# Patient Record
Sex: FEMALE | Race: WHITE | NOT HISPANIC OR LATINO
[De-identification: names, ages, dates, MRNs, and addresses within clinical notes are randomized per-mention and may not be internally consistent; named-entity substitution may affect disease eponyms.]

---

## 2017-04-17 ENCOUNTER — FORM ENCOUNTER (OUTPATIENT)
Age: 82
End: 2017-04-17

## 2017-04-18 ENCOUNTER — OUTPATIENT (OUTPATIENT)
Dept: OUTPATIENT SERVICES | Facility: HOSPITAL | Age: 82
LOS: 1 days | End: 2017-04-18
Payer: MEDICARE

## 2017-04-18 ENCOUNTER — APPOINTMENT (OUTPATIENT)
Dept: CT IMAGING | Facility: IMAGING CENTER | Age: 82
End: 2017-04-18

## 2017-04-18 DIAGNOSIS — R93.8 ABNORMAL FINDINGS ON DIAGNOSTIC IMAGING OF OTHER SPECIFIED BODY STRUCTURES: ICD-10-CM

## 2017-04-18 PROCEDURE — 71250 CT THORAX DX C-: CPT

## 2017-04-21 ENCOUNTER — RESULT REVIEW (OUTPATIENT)
Age: 82
End: 2017-04-21

## 2017-04-25 ENCOUNTER — APPOINTMENT (OUTPATIENT)
Dept: PULMONOLOGY | Facility: CLINIC | Age: 82
End: 2017-04-25

## 2017-04-25 VITALS
WEIGHT: 140 LBS | BODY MASS INDEX: 24.5 KG/M2 | HEART RATE: 83 BPM | OXYGEN SATURATION: 99 % | HEIGHT: 63.5 IN | SYSTOLIC BLOOD PRESSURE: 120 MMHG | RESPIRATION RATE: 17 BRPM | DIASTOLIC BLOOD PRESSURE: 80 MMHG

## 2017-04-25 RX ORDER — ROSUVASTATIN CALCIUM 10 MG/1
10 TABLET, FILM COATED ORAL
Qty: 90 | Refills: 0 | Status: ACTIVE | COMMUNITY
Start: 2017-02-27

## 2017-05-01 ENCOUNTER — APPOINTMENT (OUTPATIENT)
Dept: SURGERY | Facility: CLINIC | Age: 82
End: 2017-05-01

## 2017-08-18 ENCOUNTER — FORM ENCOUNTER (OUTPATIENT)
Age: 82
End: 2017-08-18

## 2017-08-19 ENCOUNTER — APPOINTMENT (OUTPATIENT)
Dept: CT IMAGING | Facility: IMAGING CENTER | Age: 82
End: 2017-08-19
Payer: MEDICARE

## 2017-08-19 ENCOUNTER — OUTPATIENT (OUTPATIENT)
Dept: OUTPATIENT SERVICES | Facility: HOSPITAL | Age: 82
LOS: 1 days | End: 2017-08-19
Payer: MEDICARE

## 2017-08-19 DIAGNOSIS — J84.10 PULMONARY FIBROSIS, UNSPECIFIED: ICD-10-CM

## 2017-08-19 PROCEDURE — 71250 CT THORAX DX C-: CPT | Mod: 26

## 2017-08-19 PROCEDURE — 71250 CT THORAX DX C-: CPT

## 2017-08-23 ENCOUNTER — APPOINTMENT (OUTPATIENT)
Dept: PULMONOLOGY | Facility: CLINIC | Age: 82
End: 2017-08-23
Payer: MEDICARE

## 2017-08-23 PROCEDURE — 94620 PULMONARY STRESS TESTING SIMPLE: CPT

## 2017-08-23 PROCEDURE — 99214 OFFICE O/P EST MOD 30 MIN: CPT | Mod: 25

## 2017-08-23 PROCEDURE — 94010 BREATHING CAPACITY TEST: CPT | Mod: 59

## 2017-08-23 RX ORDER — CICLOPIROX 80 MG/ML
8 SOLUTION TOPICAL
Qty: 7 | Refills: 0 | Status: ACTIVE | COMMUNITY
Start: 2016-11-29

## 2018-02-21 ENCOUNTER — APPOINTMENT (OUTPATIENT)
Dept: PULMONOLOGY | Facility: CLINIC | Age: 83
End: 2018-02-21
Payer: MEDICARE

## 2018-02-21 VITALS
BODY MASS INDEX: 23.97 KG/M2 | WEIGHT: 137 LBS | DIASTOLIC BLOOD PRESSURE: 80 MMHG | SYSTOLIC BLOOD PRESSURE: 110 MMHG | RESPIRATION RATE: 17 BRPM | OXYGEN SATURATION: 99 % | HEIGHT: 63.5 IN | HEART RATE: 84 BPM

## 2018-02-21 PROCEDURE — 94010 BREATHING CAPACITY TEST: CPT

## 2018-02-21 PROCEDURE — 99214 OFFICE O/P EST MOD 30 MIN: CPT | Mod: 25

## 2018-02-21 RX ORDER — CARVEDILOL 3.12 MG/1
3.12 TABLET, FILM COATED ORAL
Qty: 60 | Refills: 0 | Status: ACTIVE | COMMUNITY
Start: 2017-10-19

## 2018-03-12 ENCOUNTER — APPOINTMENT (OUTPATIENT)
Dept: SURGERY | Facility: CLINIC | Age: 83
End: 2018-03-12
Payer: MEDICARE

## 2018-03-12 PROCEDURE — 99213K: CUSTOM

## 2018-08-19 ENCOUNTER — FORM ENCOUNTER (OUTPATIENT)
Age: 83
End: 2018-08-19

## 2018-08-20 ENCOUNTER — OUTPATIENT (OUTPATIENT)
Dept: OUTPATIENT SERVICES | Facility: HOSPITAL | Age: 83
LOS: 1 days | End: 2018-08-20
Payer: MEDICARE

## 2018-08-20 ENCOUNTER — APPOINTMENT (OUTPATIENT)
Dept: CT IMAGING | Facility: IMAGING CENTER | Age: 83
End: 2018-08-20
Payer: MEDICARE

## 2018-08-20 DIAGNOSIS — R93.8 ABNORMAL FINDINGS ON DIAGNOSTIC IMAGING OF OTHER SPECIFIED BODY STRUCTURES: ICD-10-CM

## 2018-08-20 PROCEDURE — 82565 ASSAY OF CREATININE: CPT

## 2018-08-20 PROCEDURE — 71260 CT THORAX DX C+: CPT | Mod: 26

## 2018-08-20 PROCEDURE — 71260 CT THORAX DX C+: CPT

## 2018-08-27 ENCOUNTER — NON-APPOINTMENT (OUTPATIENT)
Age: 83
End: 2018-08-27

## 2018-08-27 ENCOUNTER — APPOINTMENT (OUTPATIENT)
Dept: PULMONOLOGY | Facility: CLINIC | Age: 83
End: 2018-08-27
Payer: MEDICARE

## 2018-08-27 VITALS
RESPIRATION RATE: 16 BRPM | DIASTOLIC BLOOD PRESSURE: 74 MMHG | WEIGHT: 137 LBS | SYSTOLIC BLOOD PRESSURE: 130 MMHG | HEIGHT: 64 IN | HEART RATE: 85 BPM | OXYGEN SATURATION: 98 % | BODY MASS INDEX: 23.39 KG/M2

## 2018-08-27 PROCEDURE — 99214 OFFICE O/P EST MOD 30 MIN: CPT | Mod: 25

## 2018-08-27 PROCEDURE — 94618 PULMONARY STRESS TESTING: CPT

## 2018-08-27 PROCEDURE — 94010 BREATHING CAPACITY TEST: CPT | Mod: 59

## 2018-08-27 PROCEDURE — 94729 DIFFUSING CAPACITY: CPT

## 2018-08-27 RX ORDER — LOSARTAN POTASSIUM 50 MG/1
50 TABLET, FILM COATED ORAL
Qty: 90 | Refills: 0 | Status: ACTIVE | COMMUNITY
Start: 2018-05-29

## 2018-08-27 NOTE — PHYSICAL EXAM

## 2018-08-27 NOTE — ADDENDUM
[FreeTextEntry1] : Documented by Dayo Cespedes acting as a scribe for Dr. Matty Christensen on 8/27/18\par \par All medical record entries made by the Scribe were at my, Dr. Matty Christensen's, direction and personally dictated by me on 8/27/18. I have reviewed the chart and agree that the record accurately reflects my personal performance of the history, physical exam, assessment and plan. I have also personally directed, reviewed, and agree with the discharge instructions. \par \par \par \par \par

## 2018-08-27 NOTE — HISTORY OF PRESENT ILLNESS
[FreeTextEntry1] : Ms. Gruber is a 82 year old female presenting to the office for a follow up visit for abnormal chest CT, GERD, interstitial lung disease, post-nasal drip, pulmonary fibrosis, and snoring. Her chief complaint is paresthesia in her hands. \par - She a couple of nights she has been getting paresthesia at night and while driving- when she gets out of the car it resolves. She also note that when she wakes up in the morning she has some pain in her hands that dissipate. \par - She is very active but does not ambulate in the heat.\par - She notes some occasional back pain when she works for extended periods of time. \par - She denies any headaches, nausea, vomiting, fever, chills, sweats, chest pain, chest pressure, palpitations, SOB, coughing, wheezing, fatigue, diarrhea, constipation, dysphagia, dizziness, leg swelling, leg pain, itchy eyes, itchy ears, heartburn, reflux, or sour taste in the mouth.

## 2018-08-27 NOTE — ASSESSMENT
[FreeTextEntry1] : Ms. Bee is an 83 year old female with a history of ILDz/pulmonary fibrosis, GERD, and is currently doing very well from a pulmonary perspective. \par \par problem 1: bronchospasm/ Asthma \par - Continue Symbicort 160 2 inhalations BID \par -Inhaler technique reviewed as well as oral hygiene techniques reviewed with patient. Avoidance of cold air, extremes of temperature, rescue inhaler should be used before exercise. Order of medication reviewed with patient. Recommended use of a cool mist humidifier in the bedroom. Instructed to gargle and spit after inhaler use. \par \par problem 2: ILDz\par -no progression on recent CT of the chest\par -no medications or treatment \par -Etiology will depend on the causative agent possibilities include: idiopathic pulmonary fibrosis (UIP), NSIP (nonspecific interstitial pneumonia) , respiratory bronchiolitis in someone who is a smoker, drug induced lung disease, hypersensitivity pneumonitis, BOOP, sarcoidosis, chronic congestive heart failure,  rheumatologic disorder induced interstitial lung disease. Optimal diagnosing will include rheumatological panel which would include ESR, JOVANNI, ANCA, ARNP, CCP, rheumatoid factor, hypersensitivity panel, JOE1, scleroderma antibodies, sjogren's syndrome antibodies; biopsy will be necessary to definitively determine the etiology unless the CT scan findings are specific for UIP. Therapy will be based on diagnostics. \par \par problem 3: abnormal chest CT\par -repeat CT in 1 year; August 2019\par -for her calcifications recommended cardiac evaluation (Dr. Beltran) \par \par problem 4: allergy/post nasal drip\par -recommended Nasal Crom/Xlear \par -Environmental measures for allergies were encouraged including mattress and pillow cover, air purifier, and environmental controls.\par \par problem 5: GERD\par -diet controlled\par -Rule of 2s: avoid eating too much, eating too late, eating too spicy, eating two hours before bed\par -Things to avoid including overeating, spicy foods, tight clothing, eating within three hours of bed, this list is not all inclusive. \par -For treatment of reflux, possible options discussed including diet control, H2 blockers, PPIs, as well as coating motility agents discussed as treatment options. Timing of meals and proximity of last meal to sleep were discussed. If symptoms persist, a formal gastrointestinal evaluation is needed.\par \par problem 6: primary snoring\par -recommended nasal hygiene \par \par problem 7: health maintenance \par -s/p influenza vaccination\par -recommended strep pneumonia vaccines: Prevnar-13 vaccine, followed by Pneumo vaccine 23 one year following\par -recommended early intervention for URIs\par -recommended regular osteoporosis evaluations\par -recommended early dermatological evaluations\par -recommended after the age of 50 to the age of 70, colonoscopy every 5 years \par  \par \par F/U in 6 months with full PFTs \par She is encouraged to call with any changes, concerns, or questions

## 2018-08-27 NOTE — REASON FOR VISIT
[Follow-Up] : a follow-up visit [FreeTextEntry1] : abnormal chest CT, GERD, interstitial lung disease, post-nasal drip, pulmonary fibrosis, and snoring

## 2018-08-27 NOTE — PROCEDURE
[FreeTextEntry1] : PFT- spi reveals normal flows; FEV1 was 1.91 L which is 110% of predicted; normal lung volumes; normal diffusion at 13.5, which is 80% of predicted; normal flow volume loop \par \par Patient completed a 6-minute walk/exercise study in which the Lowest Pulse Ox was  93%; there was no evidence of dyspnea or fatigue. The patient walked 0.35 miles or 570.73 meters \par \par She had a CT chest scan performed on 8/20/2018, which was a stable exam.

## 2018-11-15 ENCOUNTER — APPOINTMENT (OUTPATIENT)
Dept: ORTHOPEDIC SURGERY | Facility: CLINIC | Age: 83
End: 2018-11-15
Payer: MEDICARE

## 2018-11-15 VITALS — HEIGHT: 64 IN | BODY MASS INDEX: 22.71 KG/M2 | WEIGHT: 133 LBS

## 2018-11-15 DIAGNOSIS — M19.049 PRIMARY OSTEOARTHRITIS, UNSPECIFIED HAND: ICD-10-CM

## 2018-11-15 DIAGNOSIS — M79.642 PAIN IN RIGHT HAND: ICD-10-CM

## 2018-11-15 DIAGNOSIS — M79.641 PAIN IN RIGHT HAND: ICD-10-CM

## 2018-11-15 PROCEDURE — 73110 X-RAY EXAM OF WRIST: CPT | Mod: 50

## 2018-11-15 PROCEDURE — 99203 OFFICE O/P NEW LOW 30 MIN: CPT

## 2018-11-23 ENCOUNTER — OUTPATIENT (OUTPATIENT)
Dept: OUTPATIENT SERVICES | Facility: HOSPITAL | Age: 83
LOS: 1 days | End: 2018-11-23
Payer: MEDICARE

## 2018-11-23 VITALS
TEMPERATURE: 99 F | RESPIRATION RATE: 16 BRPM | HEIGHT: 64 IN | WEIGHT: 128.09 LBS | DIASTOLIC BLOOD PRESSURE: 73 MMHG | OXYGEN SATURATION: 96 % | SYSTOLIC BLOOD PRESSURE: 149 MMHG

## 2018-11-23 DIAGNOSIS — G56.02 CARPAL TUNNEL SYNDROME, LEFT UPPER LIMB: ICD-10-CM

## 2018-11-23 DIAGNOSIS — Z98.890 OTHER SPECIFIED POSTPROCEDURAL STATES: Chronic | ICD-10-CM

## 2018-11-23 DIAGNOSIS — Z01.818 ENCOUNTER FOR OTHER PREPROCEDURAL EXAMINATION: ICD-10-CM

## 2018-11-23 LAB
ALBUMIN SERPL ELPH-MCNC: 3.5 G/DL — SIGNIFICANT CHANGE UP (ref 3.3–5)
ALP SERPL-CCNC: 52 U/L — SIGNIFICANT CHANGE UP (ref 30–120)
ALT FLD-CCNC: 25 U/L DA — SIGNIFICANT CHANGE UP (ref 10–60)
ANION GAP SERPL CALC-SCNC: 10 MMOL/L — SIGNIFICANT CHANGE UP (ref 5–17)
AST SERPL-CCNC: 15 U/L — SIGNIFICANT CHANGE UP (ref 10–40)
BILIRUB SERPL-MCNC: 0.8 MG/DL — SIGNIFICANT CHANGE UP (ref 0.2–1.2)
BUN SERPL-MCNC: 16 MG/DL — SIGNIFICANT CHANGE UP (ref 7–23)
CALCIUM SERPL-MCNC: 9.5 MG/DL — SIGNIFICANT CHANGE UP (ref 8.4–10.5)
CHLORIDE SERPL-SCNC: 104 MMOL/L — SIGNIFICANT CHANGE UP (ref 96–108)
CO2 SERPL-SCNC: 28 MMOL/L — SIGNIFICANT CHANGE UP (ref 22–31)
CREAT SERPL-MCNC: 0.57 MG/DL — SIGNIFICANT CHANGE UP (ref 0.5–1.3)
GLUCOSE SERPL-MCNC: 96 MG/DL — SIGNIFICANT CHANGE UP (ref 70–99)
HCT VFR BLD CALC: 34 % — LOW (ref 34.5–45)
HGB BLD-MCNC: 11.2 G/DL — LOW (ref 11.5–15.5)
MCHC RBC-ENTMCNC: 29.8 PG — SIGNIFICANT CHANGE UP (ref 27–34)
MCHC RBC-ENTMCNC: 32.9 GM/DL — SIGNIFICANT CHANGE UP (ref 32–36)
MCV RBC AUTO: 90.4 FL — SIGNIFICANT CHANGE UP (ref 80–100)
NRBC # BLD: 0 /100 WBCS — SIGNIFICANT CHANGE UP (ref 0–0)
PLATELET # BLD AUTO: 326 K/UL — SIGNIFICANT CHANGE UP (ref 150–400)
POTASSIUM SERPL-MCNC: 4.4 MMOL/L — SIGNIFICANT CHANGE UP (ref 3.5–5.3)
POTASSIUM SERPL-SCNC: 4.4 MMOL/L — SIGNIFICANT CHANGE UP (ref 3.5–5.3)
PROT SERPL-MCNC: 7.4 G/DL — SIGNIFICANT CHANGE UP (ref 6–8.3)
RBC # BLD: 3.76 M/UL — LOW (ref 3.8–5.2)
RBC # FLD: 14.1 % — SIGNIFICANT CHANGE UP (ref 10.3–14.5)
SODIUM SERPL-SCNC: 142 MMOL/L — SIGNIFICANT CHANGE UP (ref 135–145)
WBC # BLD: 9.54 K/UL — SIGNIFICANT CHANGE UP (ref 3.8–10.5)
WBC # FLD AUTO: 9.54 K/UL — SIGNIFICANT CHANGE UP (ref 3.8–10.5)

## 2018-11-23 PROCEDURE — 36415 COLL VENOUS BLD VENIPUNCTURE: CPT

## 2018-11-23 PROCEDURE — G0463: CPT

## 2018-11-23 PROCEDURE — 85027 COMPLETE CBC AUTOMATED: CPT

## 2018-11-23 PROCEDURE — 93005 ELECTROCARDIOGRAM TRACING: CPT

## 2018-11-23 PROCEDURE — 93010 ELECTROCARDIOGRAM REPORT: CPT

## 2018-11-23 PROCEDURE — 80053 COMPREHEN METABOLIC PANEL: CPT

## 2018-11-23 NOTE — H&P PST ADULT - PMH
Carpal tunnel syndrome of left wrist    Carpal tunnel syndrome of right wrist    Dyslipidemia    History of breast cancer  right, lobular carcinoma, 2007  History of melanoma  left arm, 1990  Hypertension    Interstitial lung disease    Pulmonary nodules

## 2018-11-23 NOTE — H&P PST ADULT - NSANTHOSAYNRD_GEN_A_CORE
No. VALERIA screening performed.  STOP BANG Legend: 0-2 = LOW Risk; 3-4 = INTERMEDIATE Risk; 5-8 = HIGH Risk

## 2018-11-23 NOTE — H&P PST ADULT - NEGATIVE ENMT SYMPTOMS
no abnormal taste sensation/no sinus symptoms/no nasal discharge/no tinnitus/no vertigo/no nasal congestion/no gum bleeding/no throat pain/no post-nasal discharge/no nose bleeds/no ear pain/no nasal obstruction/no recurrent cold sores/no dry mouth/no dysphagia/no hearing difficulty

## 2018-11-23 NOTE — H&P PST ADULT - FAMILY HISTORY
Aunt  Still living? No  Family history of breast cancer, Age at diagnosis: Age Unknown     Father  Still living? No  Family history of diabetes mellitus, Age at diagnosis: Age Unknown     Mother  Still living? No  Family history of Alzheimer's disease, Age at diagnosis: Age Unknown     Child  Still living? Yes, Estimated age: 51-60  Family history of prostate cancer, Age at diagnosis: Age Unknown

## 2018-11-23 NOTE — H&P PST ADULT - PROBLEM SELECTOR PLAN 1
"Left carpal tunnel release" on 11/30/18  Diagnostics ordered  Pending medical clearance  Instructions reviewed and signed  Contact info given  Best wishes offered

## 2018-11-23 NOTE — H&P PST ADULT - HISTORY OF PRESENT ILLNESS
82 yo female presents to PST for scheduled LEFT carpal tunnel release on 11/30/18 with Jethro Raygoza MD.  Complains of bilateral carpal tunnel syndrome with wrist pain and numbness and tingling of fingers, worse on left.  Difficulty grasping.

## 2018-11-27 RX ORDER — CHLORHEXIDINE GLUCONATE 213 G/1000ML
1 SOLUTION TOPICAL ONCE
Qty: 0 | Refills: 0 | Status: COMPLETED | OUTPATIENT
Start: 2018-11-30 | End: 2018-11-30

## 2018-11-29 ENCOUNTER — TRANSCRIPTION ENCOUNTER (OUTPATIENT)
Age: 83
End: 2018-11-29

## 2018-11-30 ENCOUNTER — RESULT REVIEW (OUTPATIENT)
Age: 83
End: 2018-11-30

## 2018-11-30 ENCOUNTER — OUTPATIENT (OUTPATIENT)
Dept: OUTPATIENT SERVICES | Facility: HOSPITAL | Age: 83
LOS: 1 days | End: 2018-11-30
Payer: MEDICARE

## 2018-11-30 ENCOUNTER — APPOINTMENT (OUTPATIENT)
Dept: ORTHOPEDIC SURGERY | Facility: HOSPITAL | Age: 83
End: 2018-11-30

## 2018-11-30 VITALS
TEMPERATURE: 98 F | RESPIRATION RATE: 18 BRPM | HEIGHT: 64 IN | SYSTOLIC BLOOD PRESSURE: 147 MMHG | OXYGEN SATURATION: 100 % | WEIGHT: 130.29 LBS | HEART RATE: 92 BPM | DIASTOLIC BLOOD PRESSURE: 66 MMHG

## 2018-11-30 VITALS
SYSTOLIC BLOOD PRESSURE: 137 MMHG | HEART RATE: 89 BPM | DIASTOLIC BLOOD PRESSURE: 62 MMHG | OXYGEN SATURATION: 96 % | RESPIRATION RATE: 20 BRPM

## 2018-11-30 DIAGNOSIS — G56.02 CARPAL TUNNEL SYNDROME, LEFT UPPER LIMB: ICD-10-CM

## 2018-11-30 DIAGNOSIS — Z98.890 OTHER SPECIFIED POSTPROCEDURAL STATES: Chronic | ICD-10-CM

## 2018-11-30 DIAGNOSIS — Z01.818 ENCOUNTER FOR OTHER PREPROCEDURAL EXAMINATION: ICD-10-CM

## 2018-11-30 PROCEDURE — 25115 REMOVE WRIST/FOREARM LESION: CPT | Mod: LT

## 2018-11-30 PROCEDURE — 88304 TISSUE EXAM BY PATHOLOGIST: CPT | Mod: 26

## 2018-11-30 PROCEDURE — 20605 DRAIN/INJ JOINT/BURSA W/O US: CPT | Mod: LT

## 2018-11-30 PROCEDURE — 64721 CARPAL TUNNEL SURGERY: CPT | Mod: LT

## 2018-11-30 PROCEDURE — 64721 CARPAL TUNNEL SURGERY: CPT | Mod: 59,LT

## 2018-11-30 PROCEDURE — 88304 TISSUE EXAM BY PATHOLOGIST: CPT

## 2018-11-30 RX ORDER — SODIUM CHLORIDE 9 MG/ML
1000 INJECTION, SOLUTION INTRAVENOUS
Qty: 0 | Refills: 0 | Status: DISCONTINUED | OUTPATIENT
Start: 2018-11-30 | End: 2018-11-30

## 2018-11-30 RX ORDER — OXYCODONE HYDROCHLORIDE 5 MG/1
5 TABLET ORAL ONCE
Qty: 0 | Refills: 0 | Status: DISCONTINUED | OUTPATIENT
Start: 2018-11-30 | End: 2018-11-30

## 2018-11-30 RX ORDER — SODIUM CHLORIDE 9 MG/ML
1000 INJECTION, SOLUTION INTRAVENOUS
Qty: 0 | Refills: 0 | Status: DISCONTINUED | OUTPATIENT
Start: 2018-11-30 | End: 2018-12-15

## 2018-11-30 RX ORDER — IBUPROFEN 200 MG
1 TABLET ORAL
Qty: 30 | Refills: 0 | OUTPATIENT
Start: 2018-11-30

## 2018-11-30 RX ADMIN — CHLORHEXIDINE GLUCONATE 1 APPLICATION(S): 213 SOLUTION TOPICAL at 08:16

## 2018-11-30 RX ADMIN — SODIUM CHLORIDE 99 MILLILITER(S): 9 INJECTION, SOLUTION INTRAVENOUS at 10:04

## 2018-11-30 NOTE — ASU DISCHARGE PLAN (ADULT/PEDIATRIC). - MEDICATION SUMMARY - MEDICATIONS TO TAKE
I will START or STAY ON the medications listed below when I get home from the hospital:    Tylenol 500 mg oral tablet  -- 2 tab(s) by mouth every 6 hours, As Needed  -- Indication: For nsaid     mg oral tablet  -- 1 tab(s) by mouth every 6 hours, As Needed -for moderate pain   -- Do not take this drug if you are pregnant.  It is very important that you take or use this exactly as directed.  Do not skip doses or discontinue unless directed by your doctor.  May cause drowsiness or dizziness.  Obtain medical advice before taking any non-prescription drugs as some may affect the action of this medication.  Take with food or milk.    -- Indication: For pain    aspirin 81 mg oral tablet  -- 1 tab(s) by mouth once a day  -- Indication: For dvtp    losartan 50 mg oral tablet  -- 1 tab(s) by mouth once a day  -- Indication: For htn    rosuvastatin 10 mg oral tablet  -- 1 tab(s) by mouth once a day (at bedtime)  -- Indication: For Cholesterol

## 2018-11-30 NOTE — ASU DISCHARGE PLAN (ADULT/PEDIATRIC). - SPECIAL INSTRUCTIONS
ice 20 minutes on alternating with 20 minutes off for 48 hours post op  mobilize digits 20 times per hour to reduce possible stiffness and /or swelling ice 20 minutes on alternating with 20 minutes off for 48 hours post op  mobilize digits 20 times per hour to reduce possible stiffness and /or swelling  call office for appointment

## 2018-11-30 NOTE — ASU DISCHARGE PLAN (ADULT/PEDIATRIC). - NURSING INSTRUCTIONS
ice 20 minutes on alternating with 20 minutes off for 48 hours post op  mobilize digits 20 times per hour to reduce possible stiffness and /or swelling

## 2018-11-30 NOTE — BRIEF OPERATIVE NOTE - PROCEDURE
<<-----Click on this checkbox to enter Procedure Carpal tunnel release, left  11/30/2018    Active  AROMANSK

## 2018-12-03 PROBLEM — R91.8 OTHER NONSPECIFIC ABNORMAL FINDING OF LUNG FIELD: Chronic | Status: ACTIVE | Noted: 2018-11-23

## 2018-12-03 PROBLEM — J84.9 INTERSTITIAL PULMONARY DISEASE, UNSPECIFIED: Chronic | Status: ACTIVE | Noted: 2018-11-23

## 2018-12-03 PROBLEM — G56.02 CARPAL TUNNEL SYNDROME, LEFT UPPER LIMB: Chronic | Status: ACTIVE | Noted: 2018-11-23

## 2018-12-03 PROBLEM — G56.01 CARPAL TUNNEL SYNDROME, RIGHT UPPER LIMB: Chronic | Status: ACTIVE | Noted: 2018-11-23

## 2018-12-03 PROBLEM — Z85.820 PERSONAL HISTORY OF MALIGNANT MELANOMA OF SKIN: Chronic | Status: ACTIVE | Noted: 2018-11-23

## 2018-12-03 PROBLEM — E78.5 HYPERLIPIDEMIA, UNSPECIFIED: Chronic | Status: ACTIVE | Noted: 2018-11-23

## 2018-12-03 PROBLEM — Z85.3 PERSONAL HISTORY OF MALIGNANT NEOPLASM OF BREAST: Chronic | Status: ACTIVE | Noted: 2018-11-23

## 2018-12-03 PROBLEM — I10 ESSENTIAL (PRIMARY) HYPERTENSION: Chronic | Status: ACTIVE | Noted: 2018-11-23

## 2018-12-13 ENCOUNTER — APPOINTMENT (OUTPATIENT)
Dept: ORTHOPEDIC SURGERY | Facility: CLINIC | Age: 83
End: 2018-12-13
Payer: MEDICARE

## 2018-12-13 DIAGNOSIS — Z78.9 OTHER SPECIFIED HEALTH STATUS: ICD-10-CM

## 2018-12-13 DIAGNOSIS — Z80.9 FAMILY HISTORY OF MALIGNANT NEOPLASM, UNSPECIFIED: ICD-10-CM

## 2018-12-13 DIAGNOSIS — Z82.61 FAMILY HISTORY OF ARTHRITIS: ICD-10-CM

## 2018-12-13 PROCEDURE — 99024 POSTOP FOLLOW-UP VISIT: CPT

## 2018-12-31 ENCOUNTER — OUTPATIENT (OUTPATIENT)
Dept: OUTPATIENT SERVICES | Facility: HOSPITAL | Age: 83
LOS: 1 days | End: 2018-12-31
Payer: MEDICARE

## 2018-12-31 VITALS
WEIGHT: 129.63 LBS | HEIGHT: 64 IN | DIASTOLIC BLOOD PRESSURE: 82 MMHG | TEMPERATURE: 98 F | SYSTOLIC BLOOD PRESSURE: 139 MMHG | HEART RATE: 78 BPM | RESPIRATION RATE: 16 BRPM | OXYGEN SATURATION: 99 %

## 2018-12-31 DIAGNOSIS — Z98.890 OTHER SPECIFIED POSTPROCEDURAL STATES: Chronic | ICD-10-CM

## 2018-12-31 DIAGNOSIS — G56.01 CARPAL TUNNEL SYNDROME, RIGHT UPPER LIMB: ICD-10-CM

## 2018-12-31 DIAGNOSIS — Z01.818 ENCOUNTER FOR OTHER PREPROCEDURAL EXAMINATION: ICD-10-CM

## 2018-12-31 LAB
ANION GAP SERPL CALC-SCNC: 9 MMOL/L — SIGNIFICANT CHANGE UP (ref 5–17)
BUN SERPL-MCNC: 19 MG/DL — SIGNIFICANT CHANGE UP (ref 7–23)
CALCIUM SERPL-MCNC: 9.6 MG/DL — SIGNIFICANT CHANGE UP (ref 8.4–10.5)
CHLORIDE SERPL-SCNC: 103 MMOL/L — SIGNIFICANT CHANGE UP (ref 96–108)
CO2 SERPL-SCNC: 27 MMOL/L — SIGNIFICANT CHANGE UP (ref 22–31)
CREAT SERPL-MCNC: 0.56 MG/DL — SIGNIFICANT CHANGE UP (ref 0.5–1.3)
GLUCOSE SERPL-MCNC: 91 MG/DL — SIGNIFICANT CHANGE UP (ref 70–99)
HCT VFR BLD CALC: 36.5 % — SIGNIFICANT CHANGE UP (ref 34.5–45)
HGB BLD-MCNC: 11.9 G/DL — SIGNIFICANT CHANGE UP (ref 11.5–15.5)
MCHC RBC-ENTMCNC: 29.6 PG — SIGNIFICANT CHANGE UP (ref 27–34)
MCHC RBC-ENTMCNC: 32.6 GM/DL — SIGNIFICANT CHANGE UP (ref 32–36)
MCV RBC AUTO: 90.8 FL — SIGNIFICANT CHANGE UP (ref 80–100)
NRBC # BLD: 0 /100 WBCS — SIGNIFICANT CHANGE UP (ref 0–0)
PLATELET # BLD AUTO: 306 K/UL — SIGNIFICANT CHANGE UP (ref 150–400)
POTASSIUM SERPL-MCNC: 4 MMOL/L — SIGNIFICANT CHANGE UP (ref 3.5–5.3)
POTASSIUM SERPL-SCNC: 4 MMOL/L — SIGNIFICANT CHANGE UP (ref 3.5–5.3)
RBC # BLD: 4.02 M/UL — SIGNIFICANT CHANGE UP (ref 3.8–5.2)
RBC # FLD: 14.1 % — SIGNIFICANT CHANGE UP (ref 10.3–14.5)
SODIUM SERPL-SCNC: 139 MMOL/L — SIGNIFICANT CHANGE UP (ref 135–145)
WBC # BLD: 9.39 K/UL — SIGNIFICANT CHANGE UP (ref 3.8–10.5)
WBC # FLD AUTO: 9.39 K/UL — SIGNIFICANT CHANGE UP (ref 3.8–10.5)

## 2018-12-31 PROCEDURE — 80048 BASIC METABOLIC PNL TOTAL CA: CPT

## 2018-12-31 PROCEDURE — G0463: CPT

## 2018-12-31 PROCEDURE — 85027 COMPLETE CBC AUTOMATED: CPT

## 2018-12-31 PROCEDURE — 36415 COLL VENOUS BLD VENIPUNCTURE: CPT

## 2018-12-31 RX ORDER — ACETAMINOPHEN 500 MG
2 TABLET ORAL
Qty: 0 | Refills: 0 | COMMUNITY

## 2018-12-31 RX ORDER — GABAPENTIN 400 MG/1
1 CAPSULE ORAL
Qty: 0 | Refills: 0 | COMMUNITY

## 2018-12-31 RX ORDER — LOSARTAN POTASSIUM 100 MG/1
1 TABLET, FILM COATED ORAL
Qty: 0 | Refills: 0 | COMMUNITY

## 2018-12-31 RX ORDER — ROSUVASTATIN CALCIUM 5 MG/1
1 TABLET ORAL
Qty: 0 | Refills: 0 | COMMUNITY

## 2018-12-31 RX ORDER — CHOLECALCIFEROL (VITAMIN D3) 125 MCG
2 CAPSULE ORAL
Qty: 0 | Refills: 0 | COMMUNITY

## 2018-12-31 RX ORDER — ACETAMINOPHEN 500 MG
1 TABLET ORAL
Qty: 0 | Refills: 0 | COMMUNITY

## 2018-12-31 RX ORDER — ACETYLCYSTEINE 200 MG/ML
900 VIAL (ML) MISCELLANEOUS
Qty: 0 | Refills: 0 | COMMUNITY

## 2018-12-31 RX ORDER — ASPIRIN/CALCIUM CARB/MAGNESIUM 324 MG
1 TABLET ORAL
Qty: 0 | Refills: 0 | COMMUNITY

## 2018-12-31 NOTE — H&P PST ADULT - HISTORY OF PRESENT ILLNESS
82yo right hand dominant female patient with several year history of mild tingling in both hands. Since September of this year, symptoms became worse and she developed pain, numbness and some tingling in both hands. She is s/p left Carpal Tunnel Release in November. She states that her right hand pain is approximately 7 when she gets up in the morning, and she has stiffness and numbness. As the day progresses, pain eases up. She is taking Gabapentin which she thinks is helping. She takes Tylenol prn with relief. She was told that right release is now recommended and presents today for PSTs.

## 2018-12-31 NOTE — H&P PST ADULT - PMH
Carpal tunnel syndrome of left wrist    Carpal tunnel syndrome of right wrist    Dyslipidemia    History of breast cancer  right, lobular carcinoma, 2007  History of melanoma  left arm, 1990  Hypertension    Interstitial lung disease    Mitral valve prolapse    Pulmonary fibrosis    Pulmonary nodules    Vitamin D deficiency

## 2018-12-31 NOTE — H&P PST ADULT - MUSCULOSKELETAL
details… detailed exam decreased ROM due to pain/diminished strength/no joint warmth/no joint erythema/no calf tenderness/decreased ROM/right hand/no joint swelling

## 2018-12-31 NOTE — H&P PST ADULT - ASSESSMENT
82yo female pt scheduled for surgery on 1/18/18. She will obtain Medical Clearance from her PMD. She will be NPO as per Anesthesia and will take Pepcid at hs on 1/17. On AM of surgery she will take Pepcid with a sip of water. All other pre-op instructions reviewed with patient.

## 2019-01-17 ENCOUNTER — TRANSCRIPTION ENCOUNTER (OUTPATIENT)
Age: 84
End: 2019-01-17

## 2019-01-18 ENCOUNTER — APPOINTMENT (OUTPATIENT)
Dept: ORTHOPEDIC SURGERY | Facility: HOSPITAL | Age: 84
End: 2019-01-18

## 2019-01-18 ENCOUNTER — OUTPATIENT (OUTPATIENT)
Dept: OUTPATIENT SERVICES | Facility: HOSPITAL | Age: 84
LOS: 1 days | End: 2019-01-18
Payer: MEDICARE

## 2019-01-18 VITALS
SYSTOLIC BLOOD PRESSURE: 116 MMHG | OXYGEN SATURATION: 100 % | DIASTOLIC BLOOD PRESSURE: 76 MMHG | WEIGHT: 131.84 LBS | TEMPERATURE: 99 F | HEART RATE: 76 BPM | HEIGHT: 64 IN | RESPIRATION RATE: 23 BRPM

## 2019-01-18 VITALS
OXYGEN SATURATION: 100 % | RESPIRATION RATE: 18 BRPM | HEART RATE: 77 BPM | SYSTOLIC BLOOD PRESSURE: 130 MMHG | DIASTOLIC BLOOD PRESSURE: 50 MMHG

## 2019-01-18 DIAGNOSIS — G56.01 CARPAL TUNNEL SYNDROME, RIGHT UPPER LIMB: ICD-10-CM

## 2019-01-18 DIAGNOSIS — Z98.890 OTHER SPECIFIED POSTPROCEDURAL STATES: Chronic | ICD-10-CM

## 2019-01-18 PROCEDURE — 64721 CARPAL TUNNEL SURGERY: CPT | Mod: 79,RT

## 2019-01-18 PROCEDURE — 64721 CARPAL TUNNEL SURGERY: CPT | Mod: RT

## 2019-01-18 PROCEDURE — 20605 DRAIN/INJ JOINT/BURSA W/O US: CPT | Mod: 59,79,RT

## 2019-01-18 RX ORDER — SODIUM CHLORIDE 9 MG/ML
1000 INJECTION, SOLUTION INTRAVENOUS
Qty: 0 | Refills: 0 | Status: DISCONTINUED | OUTPATIENT
Start: 2019-01-18 | End: 2019-02-02

## 2019-01-18 RX ORDER — CHLORHEXIDINE GLUCONATE 213 G/1000ML
1 SOLUTION TOPICAL ONCE
Qty: 0 | Refills: 0 | Status: COMPLETED | OUTPATIENT
Start: 2019-01-18 | End: 2019-01-18

## 2019-01-18 RX ORDER — OXYCODONE AND ACETAMINOPHEN 5; 325 MG/1; MG/1
1 TABLET ORAL ONCE
Qty: 0 | Refills: 0 | Status: DISCONTINUED | OUTPATIENT
Start: 2019-01-18 | End: 2019-01-18

## 2019-01-18 RX ORDER — HYDROMORPHONE HYDROCHLORIDE 2 MG/ML
0.5 INJECTION INTRAMUSCULAR; INTRAVENOUS; SUBCUTANEOUS
Qty: 0 | Refills: 0 | Status: DISCONTINUED | OUTPATIENT
Start: 2019-01-18 | End: 2019-01-18

## 2019-01-18 RX ORDER — ACETAMINOPHEN 500 MG
650 TABLET ORAL EVERY 6 HOURS
Qty: 0 | Refills: 0 | Status: COMPLETED | OUTPATIENT
Start: 2019-01-18 | End: 2019-01-18

## 2019-01-18 RX ORDER — OXYCODONE AND ACETAMINOPHEN 5; 325 MG/1; MG/1
2 TABLET ORAL ONCE
Qty: 0 | Refills: 0 | Status: DISCONTINUED | OUTPATIENT
Start: 2019-01-18 | End: 2019-01-18

## 2019-01-18 RX ORDER — IBUPROFEN 200 MG
1 TABLET ORAL
Qty: 30 | Refills: 0 | OUTPATIENT
Start: 2019-01-18

## 2019-01-18 RX ADMIN — SODIUM CHLORIDE 50 MILLILITER(S): 9 INJECTION, SOLUTION INTRAVENOUS at 08:48

## 2019-01-18 RX ADMIN — Medication 650 MILLIGRAM(S): at 08:48

## 2019-01-18 RX ADMIN — Medication 650 MILLIGRAM(S): at 09:05

## 2019-01-18 RX ADMIN — CHLORHEXIDINE GLUCONATE 1 APPLICATION(S): 213 SOLUTION TOPICAL at 06:43

## 2019-01-18 NOTE — ASU DISCHARGE PLAN (ADULT/PEDIATRIC). - MEDICATION SUMMARY - MEDICATIONS TO TAKE
I will START or STAY ON the medications listed below when I get home from the hospital:     mg oral tablet  -- 1 tab(s) by mouth every 6 hours, As Needed -for moderate pain   -- Do not take this drug if you are pregnant.  It is very important that you take or use this exactly as directed.  Do not skip doses or discontinue unless directed by your doctor.  May cause drowsiness or dizziness.  Obtain medical advice before taking any non-prescription drugs as some may affect the action of this medication.  Take with food or milk.    -- Indication: For pain    Tylenol 500 mg oral tablet  -- 1 tab(s) by mouth every 8 hours, As Needed  -- Indication: For nsaid    losartan 50 mg oral tablet  -- 1 tab(s) by mouth once a day  -- Indication: For htn    gabapentin 300 mg oral tablet  -- 1 tab(s) by mouth 2 times a day  -- Indication: For Chronic pain    acetylcysteine  -- 900 milligram(s) by mouth once a day  -- Indication: For home med    rosuvastatin 10 mg oral tablet  -- 1 tab(s) by mouth once a day (at bedtime)  -- Indication: For Cholesterol    Multiple Vitamins oral tablet  -- 1 tab(s) by mouth once a day  -- Indication: For vitamin    Vitamin D3 2000 intl units oral tablet  -- 2 tab(s) by mouth 2 times a day  -- Indication: For vitamin

## 2019-01-18 NOTE — ASU DISCHARGE PLAN (ADULT/PEDIATRIC). - NOTIFY
Bleeding that does not stop/Swelling that continues/Fever greater than 101/Pain not relieved by Medications/Numbness, color, or temperature change to extremity

## 2019-01-18 NOTE — BRIEF OPERATIVE NOTE - PROCEDURE
<<-----Click on this checkbox to enter Procedure Carpal tunnel release, right  01/18/2019    Active  AROMANSK

## 2019-01-21 PROBLEM — E55.9 VITAMIN D DEFICIENCY, UNSPECIFIED: Chronic | Status: ACTIVE | Noted: 2018-12-31

## 2019-01-21 PROBLEM — I34.1 NONRHEUMATIC MITRAL (VALVE) PROLAPSE: Chronic | Status: ACTIVE | Noted: 2018-12-31

## 2019-01-21 PROBLEM — J84.10 PULMONARY FIBROSIS, UNSPECIFIED: Chronic | Status: ACTIVE | Noted: 2018-12-31

## 2019-01-28 ENCOUNTER — APPOINTMENT (OUTPATIENT)
Dept: ORTHOPEDIC SURGERY | Facility: CLINIC | Age: 84
End: 2019-01-28
Payer: MEDICARE

## 2019-01-28 VITALS — WEIGHT: 133 LBS | HEIGHT: 64 IN | BODY MASS INDEX: 22.71 KG/M2

## 2019-01-28 DIAGNOSIS — G56.03 CARPAL TUNNEL SYNDROM,BILATERAL UPPER LIMBS: ICD-10-CM

## 2019-01-28 PROCEDURE — 99024 POSTOP FOLLOW-UP VISIT: CPT

## 2019-01-28 NOTE — END OF VISIT
[FreeTextEntry3] : I, Jethro Naqvi MD, ordering physician, have read and attest that all the information, medical decision making and discharge instructions within are true and accurate\par

## 2019-01-28 NOTE — ADDENDUM
[FreeTextEntry1] : I, Kaylan Blanco wrote this note acting as a scribe for Dr. Jethro Naqvi on Jan 28, 2019.

## 2019-01-28 NOTE — HISTORY OF PRESENT ILLNESS
[de-identified] : S/p Right CTR 1/18/2019\par s/p L CTR 11/30/18 [de-identified] : 84 yo F presents for 1st postop visit following right CTR 1/18/2019.  Since discharge, she feels much better pain-wise compared to preop.  She has minimal numbness at the tip of the right middle finger.  She remains with more pronounced numbness in the left middle finger.  \par \par She also returns for the 2nd postoperative visit after undergoing CTR of the left hand at [facility]. The surgery was on 11/30/2018.  [de-identified] : Incision is healed. There are no signs of infection. Right hand sutures were removed.  Limited flexion of the fingers on the left hand.  [de-identified] : No imaging done today.  [de-identified] : The patient wishes to proceed with physical therapy. A script was given. Massage the scar with vitamin E oil. Hand exercises were recommended to regain mobility and strength. The patient was advised to soak hand in warm water and Epsom salt. Follow up in 6 weeks.

## 2019-02-25 ENCOUNTER — APPOINTMENT (OUTPATIENT)
Dept: PULMONOLOGY | Facility: CLINIC | Age: 84
End: 2019-02-25

## 2019-03-05 ENCOUNTER — APPOINTMENT (OUTPATIENT)
Dept: PULMONOLOGY | Facility: CLINIC | Age: 84
End: 2019-03-05
Payer: MEDICARE

## 2019-03-05 ENCOUNTER — NON-APPOINTMENT (OUTPATIENT)
Age: 84
End: 2019-03-05

## 2019-03-05 VITALS
HEART RATE: 98 BPM | DIASTOLIC BLOOD PRESSURE: 84 MMHG | TEMPERATURE: 98.3 F | OXYGEN SATURATION: 97 % | BODY MASS INDEX: 23.92 KG/M2 | RESPIRATION RATE: 17 BRPM | WEIGHT: 135 LBS | HEIGHT: 63 IN | SYSTOLIC BLOOD PRESSURE: 144 MMHG

## 2019-03-05 PROCEDURE — 99214 OFFICE O/P EST MOD 30 MIN: CPT | Mod: 25

## 2019-03-05 PROCEDURE — 94618 PULMONARY STRESS TESTING: CPT

## 2019-03-05 PROCEDURE — 94010 BREATHING CAPACITY TEST: CPT

## 2019-03-05 RX ORDER — LOSARTAN POTASSIUM 100 MG/1
TABLET, FILM COATED ORAL
Refills: 0 | Status: ACTIVE | COMMUNITY

## 2019-03-05 RX ORDER — RAMIPRIL 10 MG/1
10 CAPSULE ORAL
Qty: 180 | Refills: 0 | Status: DISCONTINUED | COMMUNITY
Start: 2016-12-13 | End: 2019-03-05

## 2019-03-05 NOTE — PHYSICAL EXAM

## 2019-03-05 NOTE — PROCEDURE
[FreeTextEntry1] : PFT- spi reveals normal flows; FEV1 was 1.86 L which is 107% of predicted;  normal flow volume loop \par \par 6 minute walk test reveals a low saturation of 91% with no evidence of dyspnea or fatigue; walked 391.2 meters

## 2019-03-05 NOTE — HISTORY OF PRESENT ILLNESS
[FreeTextEntry1] : Ms. Gruber is a 84 year old female presenting to the office for a follow up visit for abnormal chest CT, GERD, interstitial lung disease, post-nasal drip, pulmonary fibrosis, and snoring. Her chief complaint is \par - She states that she is s/p bilateral carpal tunnel release in January 2019 with Dr. Jethro Naqvi and will be undergoing OT. \par - She states that her sleep is overall good, but she will wake occasionally during the night due to urination. \par - She does report that her sleep has improved in quality following carpal tunnel release. \par - She states that her weight has been stable. \par - She states that her bowels have been regular. \par - She does report sinus congestion in the mornings occasionally. \par - She states that she had an EMG done, which has affected her ROM in her shoulders. \par - She denies any headaches, nausea, vomiting, fever, chills, sweats, chest pain, chest pressure, diarrhea, constipation, dysphagia, dizziness, leg swelling, leg pain, itchy eyes, itchy ears, heartburn, reflux, sour taste in the mouth, cough, SOB, wheeze, joint aches or pains, muscle aches or pains.

## 2019-03-05 NOTE — ASSESSMENT
[FreeTextEntry1] : Ms. Bee is an 84 year old female with a history of ILDz/pulmonary fibrosis, GERD, and is currently doing very well from a pulmonary perspective. \par \par problem 1: bronchospasm/ Asthma - stable\par - Continue Symbicort 160 2 inhalations BID \par -Inhaler technique reviewed as well as oral hygiene techniques reviewed with patient. Avoidance of cold air, extremes of temperature, rescue inhaler should be used before exercise. Order of medication reviewed with patient. Recommended use of a cool mist humidifier in the bedroom. Instructed to gargle and spit after inhaler use. \par \par problem 2: ILDz\par -no progression on recent CT of the chest 8/18 - next 8/19\par -no medications or treatment \par -Etiology will depend on the causative agent possibilities include: idiopathic pulmonary fibrosis (UIP), NSIP (nonspecific interstitial pneumonia) , respiratory bronchiolitis in someone who is a smoker, drug induced lung disease, hypersensitivity pneumonitis, BOOP, sarcoidosis, chronic congestive heart failure,  rheumatologic disorder induced interstitial lung disease. Optimal diagnosing will include rheumatological panel which would include ESR, JOVANNI, ANCA, ARNP, CCP, rheumatoid factor, hypersensitivity panel, JOE1, scleroderma antibodies, sjogren's syndrome antibodies; biopsy will be necessary to definitively determine the etiology unless the CT scan findings are specific for UIP. Therapy will be based on diagnostics. \par \par problem 3: abnormal chest CT\par -repeat CT in 1 year; August 2019\par -for her calcifications recommended cardiac evaluation (Dr. Beltran) \par \par problem 4: allergy/post nasal drip\par -recommended Nasal Crom/Xlear \par -Environmental measures for allergies were encouraged including mattress and pillow cover, air purifier, and environmental controls.\par \par problem 5: GERD\par -diet controlled\par -Rule of 2s: avoid eating too much, eating too late, eating too spicy, eating two hours before bed\par -Things to avoid including overeating, spicy foods, tight clothing, eating within three hours of bed, this list is not all inclusive. \par -For treatment of reflux, possible options discussed including diet control, H2 blockers, PPIs, as well as coating motility agents discussed as treatment options. Timing of meals and proximity of last meal to sleep were discussed. If symptoms persist, a formal gastrointestinal evaluation is needed.\par \par problem 6: primary snoring\par -recommended nasal hygiene \par \par problem 7: health maintenance \par -s/p influenza vaccination 2018\par -s/p Shingrix 12/18\par -s/p strep pneumonia vaccines: Prevnar-13 vaccine, followed by Pneumo vaccine 23 one year following (done)\par -recommended early intervention for URIs\par -recommended regular osteoporosis evaluations\par -recommended early dermatological evaluations\par -recommended after the age of 50 to the age of 70, colonoscopy every 5 years \par  \par \par F/U in 6 months with full PFTs \par She is encouraged to call with any changes, concerns, or questions

## 2019-03-05 NOTE — ADDENDUM
[FreeTextEntry1] : Documented by BOLA FLORES acting as a scribe for Dr. Matty Christensen on 03/05/2019.\par \par All medical record entries made by the Scribe were at my, Dr. Matty Christensen's, direction and personally dictated by me on 03/05/2019. I have reviewed the chart and agree that the record accurately reflects my personal performance of the history, physical exam, assessment and plan. I have also personally directed, reviewed, and agree with the discharge instructions. \par \par \par \par

## 2019-03-18 ENCOUNTER — APPOINTMENT (OUTPATIENT)
Dept: ORTHOPEDIC SURGERY | Facility: CLINIC | Age: 84
End: 2019-03-18

## 2019-04-15 NOTE — ASU PATIENT PROFILE, ADULT - PATIENT KNOW
Quality 110: Preventive Care And Screening: Influenza Immunization: Influenza immunization was not ordered or administered, reason not given Detail Level: Detailed yes

## 2019-05-13 ENCOUNTER — APPOINTMENT (OUTPATIENT)
Dept: SURGERY | Facility: CLINIC | Age: 84
End: 2019-05-13
Payer: MEDICARE

## 2019-05-13 PROCEDURE — 99213K: CUSTOM

## 2019-10-01 ENCOUNTER — FORM ENCOUNTER (OUTPATIENT)
Age: 84
End: 2019-10-01

## 2019-10-02 ENCOUNTER — OUTPATIENT (OUTPATIENT)
Dept: OUTPATIENT SERVICES | Facility: HOSPITAL | Age: 84
LOS: 1 days | End: 2019-10-02
Payer: MEDICARE

## 2019-10-02 ENCOUNTER — APPOINTMENT (OUTPATIENT)
Dept: CT IMAGING | Facility: IMAGING CENTER | Age: 84
End: 2019-10-02
Payer: MEDICARE

## 2019-10-02 DIAGNOSIS — J84.10 PULMONARY FIBROSIS, UNSPECIFIED: ICD-10-CM

## 2019-10-02 DIAGNOSIS — Z98.890 OTHER SPECIFIED POSTPROCEDURAL STATES: Chronic | ICD-10-CM

## 2019-10-02 DIAGNOSIS — R93.89 ABNORMAL FINDINGS ON DIAGNOSTIC IMAGING OF OTHER SPECIFIED BODY STRUCTURES: ICD-10-CM

## 2019-10-02 DIAGNOSIS — Z87.891 PERSONAL HISTORY OF NICOTINE DEPENDENCE: ICD-10-CM

## 2019-10-02 PROCEDURE — 71250 CT THORAX DX C-: CPT

## 2019-10-02 PROCEDURE — 71250 CT THORAX DX C-: CPT | Mod: 26

## 2019-10-08 ENCOUNTER — APPOINTMENT (OUTPATIENT)
Dept: PULMONOLOGY | Facility: CLINIC | Age: 84
End: 2019-10-08
Payer: MEDICARE

## 2019-10-08 VITALS
WEIGHT: 137 LBS | SYSTOLIC BLOOD PRESSURE: 130 MMHG | OXYGEN SATURATION: 98 % | RESPIRATION RATE: 17 BRPM | DIASTOLIC BLOOD PRESSURE: 80 MMHG | BODY MASS INDEX: 24.27 KG/M2 | HEART RATE: 80 BPM | HEIGHT: 63 IN

## 2019-10-08 PROCEDURE — ZZZZZ: CPT

## 2019-10-08 PROCEDURE — 99214 OFFICE O/P EST MOD 30 MIN: CPT | Mod: 25

## 2019-10-08 PROCEDURE — 94729 DIFFUSING CAPACITY: CPT

## 2019-10-08 PROCEDURE — 94060 EVALUATION OF WHEEZING: CPT

## 2019-10-08 PROCEDURE — 94727 GAS DIL/WSHOT DETER LNG VOL: CPT

## 2019-10-08 NOTE — HISTORY OF PRESENT ILLNESS
[FreeTextEntry1] : Ms. Gruber is a 84 year old female presenting to the office for a follow up visit for abnormal chest CT, GERD, interstitial lung disease, post-nasal drip, pulmonary fibrosis, and snoring. \par \par -Pt has been feeling overall well. Had carpal tunnel surgery recently. It has active her arthritis, but she is tolerating it well. \par -When she sleeps during the day or before bed, she will have a hard time sleeping continuously, but overall sleeps well. Pt sleeps 5-6 hours daily. \par -Her weight is stable though she may have put on a bit of weight.\par -Pt has some scarring of her pulmonary tissues. \par -Pt is not on any new medications/supplements.\par -Every morning, pt's nose runs. \par -Pt has not yet had a flu shot\par \par -she denies any headaches, nausea, vomiting, fever, chills, sweats, chest pain, chest pressure, diarrhea, constipation, dysphagia, dizziness, leg swelling, leg pain, itchy eyes, itchy ears, heartburn, reflux, or sour taste in the mouth.

## 2019-10-08 NOTE — ADDENDUM
[FreeTextEntry1] : Documented by Mayra Perez acting as scribe for Dr. Matty Christensen on (date)\par \par All medical record entries made by the scribe were at my, Dr. Matty Christensen's, direction and personally dictated by me on (date). I have reviewed the chart and agree that the record accurately reflects my personal performance of the history, physical exam, assessment and plan. I have also personally directed, reviewed, and agree with the discharge instructions. \par \par

## 2019-10-08 NOTE — PROCEDURE
[FreeTextEntry1] : CT scan from 10/02/19 showed unchanged pulmonary nodules and bibasilar reticular opacifications compatible with pulmonary fibrosis. \par \par PFT - spi reveals normal flows; FEV1 is 2.55 which is 101% of predicted, normal flow volume loop. Normal Lung Volumes / Diffusion, DLCO is 13.1 which is 77% of predicted. \par

## 2019-10-08 NOTE — ASSESSMENT
[FreeTextEntry1] : Ms. Bee is an 84 year old female with a history of ILDz/pulmonary fibrosis, RADS/PND, GERD, and is currently doing very well from a pulmonary perspective. -stable\par \par problem 1: bronchospasm/ Asthma - stable\par - Continue Symbicort 160 2 inhalations BID \par -Inhaler technique reviewed as well as oral hygiene techniques reviewed with patient. Avoidance of cold air, extremes of temperature, rescue inhaler should be used before exercise. Order of medication reviewed with patient. Recommended use of a cool mist humidifier in the bedroom. Instructed to gargle and spit after inhaler use. \par \par problem 2: ILDz\par -no progression on recent CT of the chest 10/19 - next 10/2020\par -no medications or treatment \par -Etiology will depend on the causative agent possibilities include: idiopathic pulmonary fibrosis (UIP), NSIP (nonspecific interstitial pneumonia) , respiratory bronchiolitis in someone who is a smoker, drug induced lung disease, hypersensitivity pneumonitis, BOOP, sarcoidosis, chronic congestive heart failure,  rheumatologic disorder induced interstitial lung disease. Optimal diagnosing will include rheumatological panel which would include ESR, JOVANNI, ANCA, ARNP, CCP, rheumatoid factor, hypersensitivity panel, JOE1, scleroderma antibodies, sjogren's syndrome antibodies; biopsy will be necessary to definitively determine the etiology unless the CT scan findings are specific for UIP. Therapy will be based on diagnostics. \par \par problem 3: abnormal chest CT\par -repeat CT in 1 year; 10/2020\par -for her calcifications recommended cardiac evaluation (Dr. Beltran) \par \par problem 4: allergy/post nasal drip\par -recommended Nasal Crom/Xlear \par -Environmental measures for allergies were encouraged including mattress and pillow cover, air purifier, and environmental controls.\par \par problem 5: GERD\par -diet controlled\par -Rule of 2s: avoid eating too much, eating too late, eating too spicy, eating two hours before bed\par -Things to avoid including overeating, spicy foods, tight clothing, eating within three hours of bed, this list is not all inclusive. \par -For treatment of reflux, possible options discussed including diet control, H2 blockers, PPIs, as well as coating motility agents discussed as treatment options. Timing of meals and proximity of last meal to sleep were discussed. If symptoms persist, a formal gastrointestinal evaluation is needed.\par \par problem 6: primary snoring\par -recommended nasal hygiene \par \par problem 7: health maintenance \par -s/p influenza vaccination 2019 (next week)\par -s/p Shingrix 12/18\par -s/p strep pneumonia vaccines: Prevnar-13 vaccine, followed by Pneumo vaccine 23 one year following (done)\par -recommended early intervention for URIs\par -recommended regular osteoporosis evaluations\par -recommended early dermatological evaluations\par -recommended after the age of 50 to the age of 70, colonoscopy every 5 years \par  \par \par F/U in 6 months with full PFTs \par She is encouraged to call with any changes, concerns, or questions

## 2019-10-08 NOTE — PHYSICAL EXAM
[General Appearance - Well Developed] : well developed [Normal Appearance] : normal appearance [Well Groomed] : well groomed [General Appearance - Well Nourished] : well nourished [No Deformities] : no deformities [General Appearance - In No Acute Distress] : no acute distress [Normal Conjunctiva] : the conjunctiva exhibited no abnormalities [Eyelids - No Xanthelasma] : the eyelids demonstrated no xanthelasmas [Normal Oropharynx] : normal oropharynx [II] : II [Neck Appearance] : the appearance of the neck was normal [Neck Cervical Mass (___cm)] : no neck mass was observed [Thyroid Diffuse Enlargement] : the thyroid was not enlarged [Jugular Venous Distention Increased] : there was no jugular-venous distention [Thyroid Nodule] : there were no palpable thyroid nodules [Heart Rate And Rhythm] : heart rate and rhythm were normal [Heart Sounds] : normal S1 and S2 [Murmurs] : no murmurs present [Respiration, Rhythm And Depth] : normal respiratory rhythm and effort [Exaggerated Use Of Accessory Muscles For Inspiration] : no accessory muscle use [Abdomen Soft] : soft [Abdomen Tenderness] : non-tender [Abdomen Mass (___ Cm)] : no abdominal mass palpated [Abnormal Walk] : normal gait [Gait - Sufficient For Exercise Testing] : the gait was sufficient for exercise testing [Nail Clubbing] : no clubbing of the fingernails [Cyanosis, Localized] : no localized cyanosis [Petechial Hemorrhages (___cm)] : no petechial hemorrhages [Skin Color & Pigmentation] : normal skin color and pigmentation [] : no rash [No Venous Stasis] : no venous stasis [Skin Lesions] : no skin lesions [No Xanthoma] : no  xanthoma was observed [No Skin Ulcers] : no skin ulcer [Deep Tendon Reflexes (DTR)] : deep tendon reflexes were 2+ and symmetric [Sensation] : the sensory exam was normal to light touch and pinprick [No Focal Deficits] : no focal deficits [Impaired Insight] : insight and judgment were intact [Oriented To Time, Place, And Person] : oriented to person, place, and time [Affect] : the affect was normal [FreeTextEntry1] : I:E ratio 1:3; clear

## 2020-03-14 NOTE — H&P PST ADULT - WEIGHT IN LBS
normal and S2 normal.      Assessment:  (Acute hypoxic respiratory failure  COPD exacerbation  Influenza A  CAD  HTN  Hyperlipidemia     Plan:  IV steroids, nebs, oxygen  tamiflu  Monitor labs and exam.  IV fluids. Continue home meds. ).        Shane Infante MD  3/14/2020 128

## 2020-09-01 ENCOUNTER — APPOINTMENT (OUTPATIENT)
Dept: PULMONOLOGY | Facility: CLINIC | Age: 85
End: 2020-09-01
Payer: MEDICARE

## 2020-09-01 VITALS
WEIGHT: 140 LBS | TEMPERATURE: 97.8 F | BODY MASS INDEX: 24.8 KG/M2 | OXYGEN SATURATION: 98 % | DIASTOLIC BLOOD PRESSURE: 80 MMHG | SYSTOLIC BLOOD PRESSURE: 140 MMHG | RESPIRATION RATE: 16 BRPM | HEIGHT: 63 IN | HEART RATE: 102 BPM

## 2020-09-01 PROCEDURE — 99214 OFFICE O/P EST MOD 30 MIN: CPT | Mod: 25

## 2020-09-01 PROCEDURE — 94618 PULMONARY STRESS TESTING: CPT

## 2020-09-01 NOTE — PROCEDURE
[FreeTextEntry1] : CT (OCT.2.2019) IMPRESSION: unchanged pulmonary nodules as above, bibasilar reticular opacifications compatible with pulmonary fibrosis. \par \par \par 6 minute walk test reveals a low saturation of 98% with no evidence of dyspnea or fatigue; walked  407.5 meters\par \par FENO: PT was unable to complete the test.

## 2020-09-01 NOTE — PHYSICAL EXAM

## 2020-09-01 NOTE — ADDENDUM
[FreeTextEntry1] : Documented by Kelsie Siddiqi acting as a scribe for Dr. Matty Christensen on 09/01/2020.\par \par All medical record entries made by the Scribe were at my, Dr. Matty Christensen's, direction and personally dictated by me on 09/01/2020. I have reviewed the chart and agree that the record accurately reflects my personal performance of the history, physical exam, assessment and plan. I have also personally directed, reviewed, and agree with the discharge instructions.

## 2020-09-01 NOTE — HISTORY OF PRESENT ILLNESS
[FreeTextEntry1] : Ms. Gruber is a 85 year old female presenting to the office for a follow up visit for abnormal chest CT, GERD, interstitial lung disease, post-nasal drip, pulmonary fibrosis, and snoring. \par - she is currently in shelter, its paused, shes normally a  \par - her energy levels are good, about 8 out of 10 \par - she notes a couple of months ago, it was a hot week, and she had some swelling in her foot, it lasted for a few days then went away. No swelling episodes since then.\par - about two months ago she had some heart burn, none since then \par - she notes since she was here last she has gained about 7 lbs \par - she notes she has been walking for exercise and walking up and down the stairs in her house\par - denies any palpitations\par - her sleep is sometimes good and sometimes she falls asleep watching TV so then she has trouble getting to sleep later. \par - she notes she snores \par - she is not waking up more tired than she should be \par - no visual issues, though she notes she might need cataract surgery \par - she has been taking a multi-vitamin, NAC, Vitamin D3, and Vitamin C as well as her blood pressure pill and statin. \par -she denies any headaches, nausea, vomiting, fever, chills, sweats, chest pain, chest pressure, diarrhea, constipation, dysphagia, dizziness, sour taste in the mouth, leg swelling, leg pain, itchy eyes, itchy ears, heartburn, reflux, myalgias or arthralgias.

## 2020-10-05 ENCOUNTER — APPOINTMENT (OUTPATIENT)
Dept: SURGERY | Facility: CLINIC | Age: 85
End: 2020-10-05
Payer: MEDICARE

## 2020-10-05 PROCEDURE — 99213K: CUSTOM

## 2020-10-06 ENCOUNTER — OUTPATIENT (OUTPATIENT)
Dept: OUTPATIENT SERVICES | Facility: HOSPITAL | Age: 85
LOS: 1 days | End: 2020-10-06
Payer: MEDICARE

## 2020-10-06 ENCOUNTER — APPOINTMENT (OUTPATIENT)
Dept: CT IMAGING | Facility: IMAGING CENTER | Age: 85
End: 2020-10-06
Payer: MEDICARE

## 2020-10-06 ENCOUNTER — RESULT REVIEW (OUTPATIENT)
Age: 85
End: 2020-10-06

## 2020-10-06 DIAGNOSIS — R93.89 ABNORMAL FINDINGS ON DIAGNOSTIC IMAGING OF OTHER SPECIFIED BODY STRUCTURES: ICD-10-CM

## 2020-10-06 DIAGNOSIS — J45.20 MILD INTERMITTENT ASTHMA, UNCOMPLICATED: ICD-10-CM

## 2020-10-06 DIAGNOSIS — Z98.890 OTHER SPECIFIED POSTPROCEDURAL STATES: Chronic | ICD-10-CM

## 2020-10-06 PROCEDURE — 71250 CT THORAX DX C-: CPT

## 2020-10-06 PROCEDURE — 71250 CT THORAX DX C-: CPT | Mod: 26

## 2021-01-04 DIAGNOSIS — Z01.812 ENCOUNTER FOR PREPROCEDURAL LABORATORY EXAMINATION: ICD-10-CM

## 2021-07-09 NOTE — H&P PST ADULT - MS EXT PE MLT D E PC
no pedal edema Debridement Text: The wound edges were debrided prior to proceeding with the closure to facilitate wound healing.

## 2021-08-30 ENCOUNTER — LABORATORY RESULT (OUTPATIENT)
Age: 86
End: 2021-08-30

## 2021-09-01 ENCOUNTER — APPOINTMENT (OUTPATIENT)
Dept: PULMONOLOGY | Facility: CLINIC | Age: 86
End: 2021-09-01
Payer: MEDICARE

## 2021-09-01 VITALS
TEMPERATURE: 97.5 F | SYSTOLIC BLOOD PRESSURE: 140 MMHG | OXYGEN SATURATION: 98 % | DIASTOLIC BLOOD PRESSURE: 76 MMHG | BODY MASS INDEX: 25.16 KG/M2 | HEIGHT: 63 IN | WEIGHT: 142 LBS | RESPIRATION RATE: 16 BRPM | HEART RATE: 80 BPM

## 2021-09-01 DIAGNOSIS — J45.20 MILD INTERMITTENT ASTHMA, UNCOMPLICATED: ICD-10-CM

## 2021-09-01 DIAGNOSIS — R09.82 POSTNASAL DRIP: ICD-10-CM

## 2021-09-01 DIAGNOSIS — R06.83 SNORING: ICD-10-CM

## 2021-09-01 DIAGNOSIS — K21.9 GASTRO-ESOPHAGEAL REFLUX DISEASE W/OUT ESOPHAGITIS: ICD-10-CM

## 2021-09-01 DIAGNOSIS — R06.02 SHORTNESS OF BREATH: ICD-10-CM

## 2021-09-01 DIAGNOSIS — J84.9 INTERSTITIAL PULMONARY DISEASE, UNSPECIFIED: ICD-10-CM

## 2021-09-01 DIAGNOSIS — R93.89 ABNORMAL FINDINGS ON DIAGNOSTIC IMAGING OF OTHER SPECIFIED BODY STRUCTURES: ICD-10-CM

## 2021-09-01 DIAGNOSIS — J84.10 PULMONARY FIBROSIS, UNSPECIFIED: ICD-10-CM

## 2021-09-01 PROCEDURE — 95012 NITRIC OXIDE EXP GAS DETER: CPT

## 2021-09-01 PROCEDURE — ZZZZZ: CPT

## 2021-09-01 PROCEDURE — 94618 PULMONARY STRESS TESTING: CPT

## 2021-09-01 PROCEDURE — 94729 DIFFUSING CAPACITY: CPT

## 2021-09-01 PROCEDURE — 94010 BREATHING CAPACITY TEST: CPT

## 2021-09-01 PROCEDURE — 99214 OFFICE O/P EST MOD 30 MIN: CPT | Mod: 25

## 2021-09-01 NOTE — PHYSICAL EXAM
[No Acute Distress] : no acute distress [Normal Oropharynx] : normal oropharynx [Normal Appearance] : normal appearance [No Neck Mass] : no neck mass [Normal Rate/Rhythm] : normal rate/rhythm [Normal S1, S2] : normal s1, s2 [No Murmurs] : no murmurs [No Resp Distress] : no resp distress [Clear to Auscultation Bilaterally] : clear to auscultation bilaterally [No Abnormalities] : no abnormalities [Benign] : benign [Normal Gait] : normal gait [No Clubbing] : no clubbing [No Cyanosis] : no cyanosis [No Edema] : no edema [FROM] : FROM [Normal Color/ Pigmentation] : normal color/ pigmentation [No Focal Deficits] : no focal deficits [Oriented x3] : oriented x3 [Normal Affect] : normal affect [III] : Mallampati Class: III [Kyphosis] : kyphosis [TextBox_68] : I:E 1:3, clear  [TextBox_80] : kyphotic

## 2021-09-01 NOTE — ASSESSMENT
[FreeTextEntry1] : Ms. Bee is an 86 year old female with a history of HTN, cholesterol,  ILDz/pulmonary fibrosis, RADS/PND, GERD, and is currently doing very well from a pulmonary perspective. -stable; #1 issue cervical stenosis \par \par problem 1: bronchospasm/ Asthma - stable\par - Continue Symbicort 160 2 inhalations BID \par -Inhaler technique reviewed as well as oral hygiene techniques reviewed with patient. Avoidance of cold air, extremes of temperature, rescue inhaler should be used before exercise. Order of medication reviewed with patient. Recommended use of a cool mist humidifier in the bedroom. Instructed to gargle and spit after inhaler use. \par \par problem 2: ILDz\par -no progression on recent CT of the chest 10/20 - next 10/2021\par -no medications or treatment \par -Etiology will depend on the causative agent possibilities include: idiopathic pulmonary fibrosis (UIP), NSIP (nonspecific interstitial pneumonia) , respiratory bronchiolitis in someone who is a smoker, drug induced lung disease, hypersensitivity pneumonitis, BOOP, sarcoidosis, chronic congestive heart failure,  rheumatologic disorder induced interstitial lung disease. Optimal diagnosing will include rheumatological panel which would include ESR, JOVANNI, ANCA, ARNP, CCP, rheumatoid factor, hypersensitivity panel, JOE1, scleroderma antibodies, sjogren's syndrome antibodies; biopsy will be necessary to definitively determine the etiology unless the CT scan findings are specific for UIP. Therapy will be based on diagnostics. \par \par problem 3: abnormal chest CT\par -repeat CT in 1 year; 10/2021\par -for her calcifications recommended cardiac evaluation (Dr. Beltran) \par \par problem 4: allergy/post nasal drip\par -recommended Nasal Crom/Xlear \par -Environmental measures for allergies were encouraged including mattress and pillow cover, air purifier, and environmental controls.\par \par problem 5: GERD\par -diet controlled\par -Rule of 2s: avoid eating too much, eating too late, eating too spicy, eating two hours before bed\par -Things to avoid including overeating, spicy foods, tight clothing, eating within three hours of bed, this list is not all inclusive. \par -For treatment of reflux, possible options discussed including diet control, H2 blockers, PPIs, as well as coating motility agents discussed as treatment options. Timing of meals and proximity of last meal to sleep were discussed. If symptoms persist, a formal gastrointestinal evaluation is needed.\par \par problem 6: primary snoring\par -recommended nasal hygiene \par \par Problem 7: Health Maintenance/COVID19 Precautions:\par -s/p Covid 19 Vaccine Pfizer x2 \par - Clean your hands often. Wash your hands often with soap and water for at least 20 seconds, especially after blowing your nose, coughing, or sneezing, or having been in a public place.\par - If soap and water are not available, use a hand  that contains at least 60% alcohol.\par - To the extent possible, avoid touching high-touch surfaces in public places - elevator buttons, door handles, handrails, handshaking with people, etc. Use a tissue or your sleeve to cover your hand or finger if you must touch something.\par - Wash your hands after touching surfaces in public places.\par - Avoid touching your face, nose, eyes, etc.\par - Clean and disinfect your home to remove germs: practice routine cleaning of frequently touched surfaces (for example: tables, doorknobs, light switches, handles, desks, toilets, faucets, sinks & cell phones)\par - Avoid crowds, especially in poorly ventilated spaces. Your risk of exposure to respiratory viruses like COVID-19 may increase in crowded, closed-in settings with little air circulation if there are people in the crowd who are sick. All patients are recommended to practice social distancing and stay at least 6 feet away from others.\par - Avoid all non-essential travel including plane trips, and especially avoid embarking on cruise ships.\par -If COVID-19 is spreading in your community, take extra measures to put distance between yourself and other people to further reduce your risk of being exposed to this new virus.\par -Stay home as much as possible.\par - Consider ways of getting food brought to your house through family, social, or commercial networks\par -Be aware that the virus has been known to live in the air up to 3 hours post exposure, cardboard up to 24 hours post exposure, copper up to 4 hours post exposure, steel and plastic up to 2-3 days post exposure. Risk of transmission from these surfaces are affected by many variables.\par Immune Support Recommendations:\par -OTC Vitamin C 500mg BID \par -OTC Quercetin 250-500mg BID \par -OTC Zinc 75-100mg per day \par -OTC Melatonin 1 or 2 mg a night \par -OTC Vitamin D 1-4000mg per day \par -OTC Tonic Water 8oz per day\par Asthma and COVID19:\par You need to make sure your asthma is under control. This often requires the use of inhaled corticosteroids (and sometimes oral corticosteroids). Inhaled corticosteroids do not likely reduce your immune system’s ability to fight infections, but oral corticosteroids may. It is important to use the steps above to protect yourself to limit your exposure to any respiratory virus.\par \par problem 8: health maintenance \par .recommended to improve posture and sleep posture\par .recommended strecthing \par -s/p influenza vaccination 2020\par -s/p Shingrix 12/18\par -s/p strep pneumonia vaccines: Prevnar-13 vaccine, followed by Pneumo vaccine 23 one year following (done)\par -recommended early intervention for URIs\par -recommended regular osteoporosis evaluations\par -recommended early dermatological evaluations\par -recommended after the age of 50 to the age of 70, colonoscopy every 5 years \par  \par \par F/U in 6 months with full PFTs \par She is encouraged to call with any changes, concerns, or questions

## 2021-09-01 NOTE — PROCEDURE
[FreeTextEntry1] : FULL PFTs reveals mild obstructive dysfunction; FEV1 was 1.44 L which is 78 % of predicted; normal lung volumes; mildly reduced diffusion of 7.7, which is 61 % of predicted; normal flow volume loop\par \par  FENO 12 was ; normal value being less than 25\par Fractional exhaled nitric oxide (FENO) is regarded as a simple, noninvasive method for assessing eosinophilic airway inflammation. Produced by a variety of cells within the lung, nitric oxide (NO) concentrations are generally low in healthy individuals. However, high concentrations of NO appear to be involved in nonspecific host defense mechanisms and chronic inflammatory diseases such as asthma. The American Thoracic Society (ATS) therefore has recommended using FENO to aid in the diagnosis and monitoring of eosinophilic airway inflammation and asthma, and for identifying steroid responsive individuals whose chronic respiratory symptoms may be airway inflammation. \par \par CT (10.6.2020) revealed  No change in the left lower lobe nodule or left upper lobe groundglass opacity since 8/19/2017.\par \par 6 minute walk test reveals a low saturation of 95% with slight dyspnea or fatigue; walked 293.5 meters\par

## 2021-09-01 NOTE — HISTORY OF PRESENT ILLNESS
[FreeTextEntry1] : Ms. Gruber is a 86 year old female presenting to the office for a follow up visit for abnormal chest CT, GERD, interstitial lung disease, post-nasal drip, pulmonary fibrosis, and snoring. \par \par -she notes at the end of January when shoveling snow\par -she notes having spinal stenosis, degenerative disk disease and arthritis \par -she notes undergoing physical therapy which showed some improvement \par -she notes generally feeling good\par -She notes bowels are regular \par -she denies getting enough sleep due to neck pain \par -she denies hoarseness \par -she denies SOB \par -she denies SOB on stairs\par -she notes weight is stable\par -s/p Covid 19 Vaccine Pfizer x2 \par -she notes intermittent tick that she has had all her life \par \par - She  denies any visual issues, headaches, nausea, vomiting, fever, chills, sweats, chest pains, chest pressure, diarrhea, constipation, dysphagia, dizziness, leg swelling, leg pain, itchy eyes, itchy ears, heartburn, reflux, or sour taste in the mouth.

## 2021-09-01 NOTE — ADDENDUM
[FreeTextEntry1] : Documented by John Stern acting as a scribe for Dr. Matty Christensen on 09/01/2021 \par \par All medical record entries made by the Scribe were at my, Dr. Matty Christensen's, direction and personally dictated by me on 09/01/2021 . I have reviewed the chart and agree that the record accurately reflects my personal performance of the history, physical exam, assessment and plan. I have also personally directed, reviewed, and agree with the discharge instructions

## 2021-09-15 NOTE — H&P PST ADULT - CONSTITUTIONAL
9/15/2021    Zain Lua MD  [facility] [address]  [facility] [address]    RE: Lorna Guzman       Dear Colleague,    I had the pleasure of seeing Lorna Guzman in the [facility].    SERVICE DATE: 9/15/2021     Primary Cardiologist: Dr Geiger    REASON FOR VISIT:  Lorna Guzman presents for a 3 month cardio-onc follow up    HISTORY OF PRESENT ILLNESS:   Lorna is followed in our clinic from a Cardio-Oncology perspective due to a history of a chemo-induced cardiomyopathy and metastatic breast carcinoma.    She was initially diagnosed with breast cancer in 2006, at which time she underwent a lumpectomy, CHOP chemotherapy and adjuvant radiation.  She was started on tamoxifen and after 2 years was transitioned to Aromasin.  She completed this treatment in 07/2013. Unfortunately, in late 2017 she was again diagnosed with an invasive ductal carcinoma of the right breast.  She was also found to have evidence of metastatic disease (in the liver and a lymph node) at that time.  As part of her workup, she underwent an echocardiogram which showed mildly reduced LV systolic function with an EF of approximately 47%.  It was at that time that she was referred to Cardiology.  She had a stress cardiac MRI which showed normal LV size with mild to moderately reduced function and an LVEF of 43%.  She was also noted to have mitral valve prolapse and mild mitral regurgitation.  Stress imaging did not show any evidence of ischemia.  She was started on carvedilol and lisinopril.  Her cancer was treated with 4 cycles of TC chemotherapy, and she was started on Herceptin on an every 3 week basis, which was to continue lifelong.  Fortunately, from a cardiac standpoint, followup cardiac MRIs and echocardiography have shown slight improvement/stability of her LVEF with ongoing therapy.  In the late summer/early fall of 2018 she did  develop severe facial swelling and it was felt that this was related to angioedema secondary to her lisinopril, which was discontinued.  At that time, we decided to continue with carvedilol alone unless we saw a decline in her LV systolic function. Spring 2019 she was noted to have a growing R breast mass, she was started on fulverstrant and ribociclib. She had a R mastectomy 10/2019 and had radiation in March 2020. Late 2020 she was found to have more metastatic disease in her chest/lungs. Fulverstrant and ribociclib stopped and she has started chemo with paclitaxel, pertuzumab and trastuzumab.    Unfortunately she developed a PE/DVT in Feb 2021, she was briefly hospitalized. A limited echocardiogram suggested probably stable LV systolic function, but she had significant RV dysfunction related to the pulmonary embolism.  The RV cavity was moderately to severely dilated with moderately reduced function.  Her chemo was put on hold and she also had a reduction in her carvedilol dose because of low blood pressures. Echo in March showed LVEF 45-50%, stable from Feb. GLS-17%. RV was normal size, RV systolic function is borderline reduced. She was given the ok to resume her cancer meds.    April 2021 TTE showed a decline in the GLS -15.4%, LVEF is 41%. Coreg increased to 12.5mg BID. Repeat echo 1 month later showed LVEF 45-50%, GLS -17%.    Unfortunately her scans in July showed some progression of her disease. She was transitioned to ado-trastuzumab.    Since her last visit she has been feeling well from a cardiac standpoint. No CV concerns or symptoms.    CURRENT MEDICATIONS:   Current Outpatient Medications   Medication Sig Dispense Refill     ADO-trastuzumab        carvedilol (COREG) 12.5 MG tablet Take 1 tablet (12.5 mg) by mouth 2 times daily (with meals) Increased dose. Please fill. 180 tablet 3     rivaroxaban ANTICOAGULANT (XARELTO ANTICOAGULANT) 20 MG TABS tablet Take 1 tablet (20 mg) by mouth daily (with  "dinner)         ALLERGIES:  Allergies   Allergen Reactions     Lisinopril Hives and Swelling     Venlafaxine Hives and Swelling     Possibly allergy       ROS:  Skin:  Negative     Eyes:  Positive for glasses  ENT:  Negative    Respiratory:  Negative    Cardiovascular:  Negative    Gastroenterology: Negative    Genitourinary:  Negative    Musculoskeletal:  Negative    Neurologic:  Negative    Psychiatric:  Negative    Heme/Lymph/Imm:  Negative    Endocrine:  Negative      PHYSICAL EXAMINATION:    GENERAL:  The patient is a pleasant 65 year old who appears their stated age.  In no apparent distress.  VITAL SIGNS:  /76 (BP Location: Right arm, Patient Position: Sitting, Cuff Size: Adult Regular)   Pulse 80   Ht 1.753 m (5' 9\")   Wt 57 kg (125 lb 9.6 oz)   LMP  (LMP Unknown)   SpO2 95%   Breastfeeding No   BMI 18.55 kg/m      RESPIRATORY:  Breathing is nonlabored.    CARDIAC:  RRR. No murmur  NEUROLOGIC:  Alert and oriented.    DATA REVIEWED:    TTE 9/14/21:  The visual ejection fraction is 50-55%.  GLS -18.1%.  Both the above appear slightly iimproved compared with most recent study.  Mild to mod MR, mild TR and OR.    ASSESSMENT AND PLAN:    65 year old F with metastatic breast CA with complication of recent DVT/PE in Feb 2021 and a chemo-induced CM whom returns for follow up. She is followed closely as she was on life-long herceptin, however recently was found to have disease progression and transitioned to ado-trastuzumab. She still will need serial echos with this treatment.      Her echo this week shows improvement from 3 months ago. I recommended she continue her current dose of coreg. Will plan a repeat echo in 3 months and clinic follow up at that time as well.    She is on life-long AC due to DVT/PE in the setting of CA.     Follow up: 3 months with echo prior.  Of course, the patient was encouraged to contact us sooner with questions or concerns.    Melisa Billingsley PA-C        Thank you " for allowing me to participate in the care of your patient.      Sincerely,     Melisa Billingsley PA-C     [facility]  cc:   Melisa Billingsley PA-C  [facility]  [address] [place] [address]         Well-developed, well nourished

## 2021-09-24 ENCOUNTER — NON-APPOINTMENT (OUTPATIENT)
Age: 86
End: 2021-09-24

## 2021-09-24 ENCOUNTER — APPOINTMENT (OUTPATIENT)
Dept: OPHTHALMOLOGY | Facility: CLINIC | Age: 86
End: 2021-09-24
Payer: MEDICARE

## 2021-09-24 PROCEDURE — 92014 COMPRE OPH EXAM EST PT 1/>: CPT

## 2021-09-24 PROCEDURE — 92134 CPTRZ OPH DX IMG PST SGM RTA: CPT

## 2021-09-24 PROCEDURE — 92202 OPSCPY EXTND ON/MAC DRAW: CPT

## 2021-10-04 ENCOUNTER — APPOINTMENT (OUTPATIENT)
Dept: SURGERY | Facility: CLINIC | Age: 86
End: 2021-10-04
Payer: MEDICARE

## 2021-10-04 PROCEDURE — 99213K: CUSTOM

## 2021-10-25 ENCOUNTER — APPOINTMENT (OUTPATIENT)
Dept: CT IMAGING | Facility: IMAGING CENTER | Age: 86
End: 2021-10-25
Payer: MEDICARE

## 2021-10-25 ENCOUNTER — OUTPATIENT (OUTPATIENT)
Dept: OUTPATIENT SERVICES | Facility: HOSPITAL | Age: 86
LOS: 1 days | End: 2021-10-25
Payer: MEDICARE

## 2021-10-25 DIAGNOSIS — R93.89 ABNORMAL FINDINGS ON DIAGNOSTIC IMAGING OF OTHER SPECIFIED BODY STRUCTURES: ICD-10-CM

## 2021-10-25 DIAGNOSIS — Z98.890 OTHER SPECIFIED POSTPROCEDURAL STATES: Chronic | ICD-10-CM

## 2021-10-25 DIAGNOSIS — R91.8 OTHER NONSPECIFIC ABNORMAL FINDING OF LUNG FIELD: ICD-10-CM

## 2021-10-25 PROCEDURE — 71250 CT THORAX DX C-: CPT

## 2021-10-25 PROCEDURE — 71250 CT THORAX DX C-: CPT | Mod: 26,MH

## 2021-10-27 ENCOUNTER — NON-APPOINTMENT (OUTPATIENT)
Age: 86
End: 2021-10-27

## 2021-11-04 ENCOUNTER — NON-APPOINTMENT (OUTPATIENT)
Age: 86
End: 2021-11-04

## 2021-11-10 ENCOUNTER — OUTPATIENT (OUTPATIENT)
Dept: OUTPATIENT SERVICES | Facility: HOSPITAL | Age: 86
LOS: 1 days | End: 2021-11-10
Payer: MEDICARE

## 2021-11-10 ENCOUNTER — APPOINTMENT (OUTPATIENT)
Dept: NUCLEAR MEDICINE | Facility: IMAGING CENTER | Age: 86
End: 2021-11-10
Payer: MEDICARE

## 2021-11-10 DIAGNOSIS — R91.8 OTHER NONSPECIFIC ABNORMAL FINDING OF LUNG FIELD: ICD-10-CM

## 2021-11-10 DIAGNOSIS — Z98.890 OTHER SPECIFIED POSTPROCEDURAL STATES: Chronic | ICD-10-CM

## 2021-11-10 DIAGNOSIS — R93.89 ABNORMAL FINDINGS ON DIAGNOSTIC IMAGING OF OTHER SPECIFIED BODY STRUCTURES: ICD-10-CM

## 2021-11-10 PROCEDURE — 78815 PET IMAGE W/CT SKULL-THIGH: CPT

## 2021-11-10 PROCEDURE — A9552: CPT

## 2021-11-10 PROCEDURE — 78815 PET IMAGE W/CT SKULL-THIGH: CPT | Mod: 26,PI,MH

## 2021-11-15 ENCOUNTER — NON-APPOINTMENT (OUTPATIENT)
Age: 86
End: 2021-11-15

## 2021-11-16 ENCOUNTER — APPOINTMENT (OUTPATIENT)
Dept: THORACIC SURGERY | Facility: CLINIC | Age: 86
End: 2021-11-16
Payer: MEDICARE

## 2021-11-16 ENCOUNTER — NON-APPOINTMENT (OUTPATIENT)
Age: 86
End: 2021-11-16

## 2021-11-16 VITALS
HEART RATE: 102 BPM | DIASTOLIC BLOOD PRESSURE: 91 MMHG | HEIGHT: 63 IN | WEIGHT: 142 LBS | RESPIRATION RATE: 18 BRPM | OXYGEN SATURATION: 97 % | BODY MASS INDEX: 25.16 KG/M2 | SYSTOLIC BLOOD PRESSURE: 171 MMHG

## 2021-11-16 DIAGNOSIS — R91.8 OTHER NONSPECIFIC ABNORMAL FINDING OF LUNG FIELD: ICD-10-CM

## 2021-11-16 PROCEDURE — 99205 OFFICE O/P NEW HI 60 MIN: CPT

## 2021-11-19 PROBLEM — R91.8 LUNG NODULES: Status: ACTIVE | Noted: 2020-10-09

## 2021-11-21 NOTE — HISTORY OF PRESENT ILLNESS
[FreeTextEntry1] : Ms. IRENE RONQUILLO, 86 year old female, former smoker (x 30 years; Quit 2006), w/ hx of HTN, HLD, Lobular carcinoma (s/p Lumpectomy in 2006), Interstitial lung disease, pulmonary fibrosis. Referred today by Dr. Christensen for evaluation regarding suspicious lung nodule. \par \par CT Chest on 10/25/2021:\par - New, indeterminate 1.6 x 1.1 cm right apical nodule with mild surrounding groundglass. Focal calcification within this new nodule corresponds to a known calcified granuloma present on prior studies. \par - Small clustered nodules just superior to the lesion\par - Stable, 1 cm left apical groundlgass nodule and other scattered sub- 6 mm solid pulmonary nodules since 2017. \par - Stable, mild peripheral reticulation at the bases without traction bronchiectasis or honeycombing. \par \par Patient presents today for evaluation. Today, patient denies worsening SOB, chest pain, cough, hemoptysis, fever, chills, night sweats, lightheadedness or dizziness.\par

## 2021-11-21 NOTE — CONSULT LETTER
[Dear  ___] : Dear  [unfilled], [Consult Letter:] : I had the pleasure of evaluating your patient, [unfilled]. [Please see my note below.] : Please see my note below. [Sincerely,] : Sincerely, [FreeTextEntry2] : Dr. Matty Christensen (Pulm/Ref) [FreeTextEntry3] : Max Guzman MD, FACS \par Chief, Division of Thoracic Surgery \par Director, Minimally Invasive Thoracic Surgery \par Department of Cardiovascular and Thoracic Surgery \par [facility] \par , Cardiovascular and Thoracic Surgery\par \par \par

## 2021-11-21 NOTE — ASSESSMENT
[FreeTextEntry1] : Ms. IRENE RONQUILLO, 86 year old female, former smoker (x 30 years; Quit 2006), w/ hx of HTN, HLD, Lobular carcinoma (s/p Lumpectomy in 2006), Interstitial lung disease, pulmonary fibrosis. Referred today by Dr. Christensen for evaluation regarding suspicious lung nodule. \par \par I have independently reviewed the medical records and imaging at the time of this office consultation, and discussed the following interpretations and recommendations with the patient:\par - Discussed that in order to fully rule out malignancy, a surgical biopsy is advised. We discussed the options of a CT guided biopsy vs surgical biopsy and the significance of a non-diagnostic biopsy/negative biopsy as not being sufficient to exclude malignancy. Therefore, have recommended a surgical excisional biopsy with wedge resection followed by intraoperative frozen section and possible anatomic lobectomy with lymph node dissection. Also discussed more conservative option of continuing to monitor with surveillance CT scans every 3 months; Discussed risk of possible disease progression without definitive diagnosis and further treatment\par - At this time, patient would like to consider all options before making a decision. If she decides to proceed with surgery, Cardiac clearance would be required prior to procedure. \par \par Recommendations reviewed with patient during this office visit, and all questions answered; Patient instructed on the importance of follow up and verbalizes understanding.\par \par I personally performed the services described in the documentation, reviewed the documentation recorded by the scribe in my presence and it accurately and completely records my words and actions.\par \par I, CECIL Sheehan, am scribing for and the presence of DONAVON TuckerIM, the following sections HISTORY OF PRESENT ILLNESS, PAST MEDICAL/FAMILY/SOCIAL HISTORY; REVIEW OF SYSTEMS; VITAL SIGNS; PHYSICAL EXAM; DISPOSITION.\par \par \par \par

## 2021-11-21 NOTE — DATA REVIEWED
[FreeTextEntry1] : Independently reviewed the following imaging:\par - CT chest on 10/25/2021\par - PET/CT on 11/10/2021

## 2021-11-21 NOTE — PHYSICAL EXAM
[Restricted in physically strenuous activity but ambulatory and able to carry out work of a light or sedentary nature] : Status 1- Restricted in physically strenuous activity but ambulatory and able to carry out work of a light or sedentary nature, e.g., light house work, office work [General Appearance - Alert] : alert [General Appearance - In No Acute Distress] : in no acute distress [General Appearance - Well Nourished] : well nourished [General Appearance - Well Developed] : well developed [Sclera] : the sclera and conjunctiva were normal [Extraocular Movements] : extraocular movements were intact [Outer Ear] : the ears and nose were normal in appearance [Hearing Threshold Finger Rub Not [place]] : hearing was normal [Neck Appearance] : the appearance of the neck was normal [Neck Cervical Mass (___cm)] : no neck mass was observed [Respiration, Rhythm And Depth] : normal respiratory rhythm and effort [Exaggerated Use Of Accessory Muscles For Inspiration] : no accessory muscle use [Apical Impulse] : the apical impulse was normal [Heart Rate And Rhythm] : heart rate was normal and rhythm regular [Examination Of The Chest] : the chest was normal in appearance [Chest Visual Inspection Thoracic Asymmetry] : no chest asymmetry [Diminished Respiratory Excursion] : normal chest expansion [2+] : left 2+ [Breast Appearance] : normal in appearance [Breast Palpation Mass] : no palpable masses [Bowel Sounds] : normal bowel sounds [Abdomen Soft] : soft [Abdomen Tenderness] : non-tender [Cervical Lymph Nodes Enlarged Posterior Bilaterally] : posterior cervical [Cervical Lymph Nodes Enlarged Anterior Bilaterally] : anterior cervical [Supraclavicular Lymph Nodes Enlarged Bilaterally] : supraclavicular [No CVA Tenderness] : no ~M costovertebral angle tenderness [No Spinal Tenderness] : no spinal tenderness [Abnormal Walk] : normal gait [Nail Clubbing] : no clubbing  or cyanosis of the fingernails [Musculoskeletal - Swelling] : no joint swelling seen [Skin Color & Pigmentation] : normal skin color and pigmentation [Skin Turgor] : normal skin turgor [Deep Tendon Reflexes (DTR)] : deep tendon reflexes were 2+ and symmetric [] : no rash [Sensation] : the sensory exam was normal to light touch and pinprick [No Focal Deficits] : no focal deficits [Oriented To Time, Place, And Person] : oriented to person, place, and time [Impaired Insight] : insight and judgment were intact [Affect] : the affect was normal [FreeTextEntry1] : Deferred

## 2022-03-01 ENCOUNTER — APPOINTMENT (OUTPATIENT)
Dept: PULMONOLOGY | Facility: CLINIC | Age: 87
End: 2022-03-01

## 2022-10-17 ENCOUNTER — APPOINTMENT (OUTPATIENT)
Dept: SURGERY | Facility: CLINIC | Age: 87
End: 2022-10-17

## 2022-10-17 PROCEDURE — 99213K: CUSTOM

## 2023-01-01 NOTE — ASU PATIENT PROFILE, ADULT - NSALCOHOLAMT_GEN_A_CORE_SD
Overfeeding a baby often causes the baby discomfort because he or she can't digest all of the breast milk or formula properly. When fed too much, a baby may also swallow air, which can produce gas, increase discomfort in the belly, and lead to crying. An overfed baby also may spit up more than usual and have loose stools. Although crying from discomfort is not colic, it can make crying more frequent and more intense in an already colicky baby.    Babies give cues during feeding that indicate how hungry they are. Pay attention to these cues to help determine when your baby has had enough to eat.    A baby who is hungry will latch on to the breast or bottle and suck continuously.  A baby who is getting full during a feeding will take longer pauses between sucking.  A baby who is full will turn away from the breast or bottle and not want to suck.  The amount of food each baby needs varies. Young babies usually do not take more breast milk or formula than they need. In general, your baby should seem healthy and happy and have good muscle tone, healthy skin, and good colour.
1-2 drinks

## 2023-12-13 ENCOUNTER — APPOINTMENT (OUTPATIENT)
Dept: SURGERY | Facility: CLINIC | Age: 88
End: 2023-12-13
Payer: MEDICARE

## 2023-12-13 PROCEDURE — 99213K: CUSTOM

## 2024-03-08 NOTE — ASSESSMENT
Received return call from patient. Results reviewed. Opportunity given to ask and answer questions. No further needs at this time.     [FreeTextEntry1] : Ms. Bee is an 85 year old female with a history of HTN, cholesterol,  ILDz/pulmonary fibrosis, RADS/PND, GERD, and is currently doing very well from a pulmonary perspective. -stable\par \par problem 1: bronchospasm/ Asthma - stable\par - Continue Symbicort 160 2 inhalations BID \par -Inhaler technique reviewed as well as oral hygiene techniques reviewed with patient. Avoidance of cold air, extremes of temperature, rescue inhaler should be used before exercise. Order of medication reviewed with patient. Recommended use of a cool mist humidifier in the bedroom. Instructed to gargle and spit after inhaler use. \par \par problem 2: ILDz\par -no progression on recent CT of the chest 10/19 - next 10/2020\par -no medications or treatment \par -Etiology will depend on the causative agent possibilities include: idiopathic pulmonary fibrosis (UIP), NSIP (nonspecific interstitial pneumonia) , respiratory bronchiolitis in someone who is a smoker, drug induced lung disease, hypersensitivity pneumonitis, BOOP, sarcoidosis, chronic congestive heart failure,  rheumatologic disorder induced interstitial lung disease. Optimal diagnosing will include rheumatological panel which would include ESR, JOVANNI, ANCA, ARNP, CCP, rheumatoid factor, hypersensitivity panel, JOE1, scleroderma antibodies, sjogren's syndrome antibodies; biopsy will be necessary to definitively determine the etiology unless the CT scan findings are specific for UIP. Therapy will be based on diagnostics. \par \par problem 3: abnormal chest CT\par -repeat CT in 1 year; 10/2020\par -for her calcifications recommended cardiac evaluation (Dr. Beltran) \par \par problem 4: allergy/post nasal drip\par -recommended Nasal Crom/Xlear \par -Environmental measures for allergies were encouraged including mattress and pillow cover, air purifier, and environmental controls.\par \par problem 5: GERD\par -diet controlled\par -Rule of 2s: avoid eating too much, eating too late, eating too spicy, eating two hours before bed\par -Things to avoid including overeating, spicy foods, tight clothing, eating within three hours of bed, this list is not all inclusive. \par -For treatment of reflux, possible options discussed including diet control, H2 blockers, PPIs, as well as coating motility agents discussed as treatment options. Timing of meals and proximity of last meal to sleep were discussed. If symptoms persist, a formal gastrointestinal evaluation is needed.\par \par problem 6: primary snoring\par -recommended nasal hygiene \par \par Problem 7: Health Maintenance/COVID19 Precautions:\par - Clean your hands often. Wash your hands often with soap and water for at least 20 seconds, especially after blowing your nose, coughing, or sneezing, or having been in a public place.\par - If soap and water are not available, use a hand  that contains at least 60% alcohol.\par - To the extent possible, avoid touching high-touch surfaces in public places - elevator buttons, door handles, handrails, handshaking with people, etc. Use a tissue or your sleeve to cover your hand or finger if you must touch something.\par - Wash your hands after touching surfaces in public places.\par - Avoid touching your face, nose, eyes, etc.\par - Clean and disinfect your home to remove germs: practice routine cleaning of frequently touched surfaces (for example: tables, doorknobs, light switches, handles, desks, toilets, faucets, sinks & cell phones)\par - Avoid crowds, especially in poorly ventilated spaces. Your risk of exposure to respiratory viruses like COVID-19 may increase in crowded, closed-in settings with little air circulation if there are people in the crowd who are sick. All patients are recommended to practice social distancing and stay at least 6 feet away from others.\par - Avoid all non-essential travel including plane trips, and especially avoid embarking on cruise ships.\par -If COVID-19 is spreading in your community, take extra measures to put distance between yourself and other people to further reduce your risk of being exposed to this new virus.\par -Stay home as much as possible.\par - Consider ways of getting food brought to your house through family, social, or commercial networks\par -Be aware that the virus has been known to live in the air up to 3 hours post exposure, cardboard up to 24 hours post exposure, copper up to 4 hours post exposure, steel and plastic up to 2-3 days post exposure. Risk of transmission from these surfaces are affected by many variables.\par Immune Support Recommendations:\par -OTC Vitamin C 500mg BID \par -OTC Quercetin 250-500mg BID \par -OTC Zinc 75-100mg per day \par -OTC Melatonin 1 or 2 mg a night \par -OTC Vitamin D 1-4000mg per day \par -OTC Tonic Water 8oz per day\par Asthma and COVID19:\par You need to make sure your asthma is under control. This often requires the use of inhaled corticosteroids (and sometimes oral corticosteroids). Inhaled corticosteroids do not likely reduce your immune system’s ability to fight infections, but oral corticosteroids may. It is important to use the steps above to protect yourself to limit your exposure to any respiratory virus.\par \par problem 8: health maintenance \par -s/p influenza vaccination 2020\par -s/p Shingrix 12/18\par -s/p strep pneumonia vaccines: Prevnar-13 vaccine, followed by Pneumo vaccine 23 one year following (done)\par -recommended early intervention for URIs\par -recommended regular osteoporosis evaluations\par -recommended early dermatological evaluations\par -recommended after the age of 50 to the age of 70, colonoscopy every 5 years \par  \par \par F/U in 6 months with full PFTs \par She is encouraged to call with any changes, concerns, or questions

## 2024-08-15 NOTE — H&P PST ADULT - NSANTHOBSERVEDRD_ENT_A_CORE
Dr. Hamilton notified of CT result. Patient notified and told to be at Dr[facility] Monday at 0950. Voiced agreement.  No

## 2024-08-15 NOTE — ASU PATIENT PROFILE, ADULT - BLOOD TRANSFUSION, PREVIOUS, PROFILE
Please continue Benadryl/Zyrtec, cold compresses for the foot.  Please use warm compresses and antibiotic drops for the right eye.  Please follow-up with your primary care doctor return the ER for any concerning reason.   no

## 2024-11-11 ENCOUNTER — APPOINTMENT (OUTPATIENT)
Dept: OPHTHALMOLOGY | Facility: CLINIC | Age: 89
End: 2024-11-11

## 2024-11-20 ENCOUNTER — NON-APPOINTMENT (OUTPATIENT)
Age: 89
End: 2024-11-20

## 2024-11-20 ENCOUNTER — APPOINTMENT (OUTPATIENT)
Dept: CARDIOLOGY | Facility: CLINIC | Age: 89
End: 2024-11-20
Payer: MEDICARE

## 2024-11-20 VITALS
HEART RATE: 88 BPM | SYSTOLIC BLOOD PRESSURE: 148 MMHG | HEIGHT: 63 IN | BODY MASS INDEX: 23.74 KG/M2 | OXYGEN SATURATION: 100 % | WEIGHT: 134 LBS | DIASTOLIC BLOOD PRESSURE: 70 MMHG

## 2024-11-20 DIAGNOSIS — I10 ESSENTIAL (PRIMARY) HYPERTENSION: ICD-10-CM

## 2024-11-20 DIAGNOSIS — R06.02 SHORTNESS OF BREATH: ICD-10-CM

## 2024-11-20 DIAGNOSIS — J84.10 PULMONARY FIBROSIS, UNSPECIFIED: ICD-10-CM

## 2024-11-20 DIAGNOSIS — R93.89 ABNORMAL FINDINGS ON DIAGNOSTIC IMAGING OF OTHER SPECIFIED BODY STRUCTURES: ICD-10-CM

## 2024-11-20 DIAGNOSIS — R06.83 SNORING: ICD-10-CM

## 2024-11-20 DIAGNOSIS — R91.8 OTHER NONSPECIFIC ABNORMAL FINDING OF LUNG FIELD: ICD-10-CM

## 2024-11-20 PROCEDURE — 93000 ELECTROCARDIOGRAM COMPLETE: CPT

## 2024-11-20 PROCEDURE — G2211 COMPLEX E/M VISIT ADD ON: CPT

## 2024-11-20 PROCEDURE — 99204 OFFICE O/P NEW MOD 45 MIN: CPT

## 2024-12-02 ENCOUNTER — APPOINTMENT (OUTPATIENT)
Dept: SURGERY | Facility: CLINIC | Age: 89
End: 2024-12-02
Payer: MEDICARE

## 2024-12-02 PROCEDURE — 99213K: CUSTOM

## 2024-12-04 ENCOUNTER — APPOINTMENT (OUTPATIENT)
Dept: OPHTHALMOLOGY | Facility: CLINIC | Age: 89
End: 2024-12-04
Payer: MEDICARE

## 2024-12-04 ENCOUNTER — NON-APPOINTMENT (OUTPATIENT)
Age: 89
End: 2024-12-04

## 2024-12-04 PROCEDURE — 92250 FUNDUS PHOTOGRAPHY W/I&R: CPT

## 2024-12-04 PROCEDURE — 92083 EXTENDED VISUAL FIELD XM: CPT

## 2024-12-04 PROCEDURE — 92014 COMPRE OPH EXAM EST PT 1/>: CPT

## 2024-12-04 PROCEDURE — 92004 COMPRE OPH EXAM NEW PT 1/>: CPT

## 2024-12-09 ENCOUNTER — APPOINTMENT (OUTPATIENT)
Dept: CARDIOLOGY | Facility: CLINIC | Age: 89
End: 2024-12-09
Payer: MEDICARE

## 2024-12-09 PROCEDURE — 93306 TTE W/DOPPLER COMPLETE: CPT

## 2024-12-09 PROCEDURE — 93880 EXTRACRANIAL BILAT STUDY: CPT

## 2025-05-21 ENCOUNTER — NON-APPOINTMENT (OUTPATIENT)
Age: 89
End: 2025-05-21

## 2025-05-21 ENCOUNTER — APPOINTMENT (OUTPATIENT)
Dept: CARDIOLOGY | Facility: CLINIC | Age: 89
End: 2025-05-21
Payer: MEDICARE

## 2025-05-21 VITALS
WEIGHT: 133.5 LBS | OXYGEN SATURATION: 100 % | HEIGHT: 63 IN | HEART RATE: 69 BPM | SYSTOLIC BLOOD PRESSURE: 160 MMHG | DIASTOLIC BLOOD PRESSURE: 68 MMHG | BODY MASS INDEX: 23.65 KG/M2

## 2025-05-21 DIAGNOSIS — R93.89 ABNORMAL FINDINGS ON DIAGNOSTIC IMAGING OF OTHER SPECIFIED BODY STRUCTURES: ICD-10-CM

## 2025-05-21 DIAGNOSIS — J84.10 PULMONARY FIBROSIS, UNSPECIFIED: ICD-10-CM

## 2025-05-21 DIAGNOSIS — R06.02 SHORTNESS OF BREATH: ICD-10-CM

## 2025-05-21 DIAGNOSIS — G56.03 CARPAL TUNNEL SYNDROM,BILATERAL UPPER LIMBS: ICD-10-CM

## 2025-05-21 DIAGNOSIS — I10 ESSENTIAL (PRIMARY) HYPERTENSION: ICD-10-CM

## 2025-05-21 DIAGNOSIS — R91.8 OTHER NONSPECIFIC ABNORMAL FINDING OF LUNG FIELD: ICD-10-CM

## 2025-05-21 DIAGNOSIS — K21.9 GASTRO-ESOPHAGEAL REFLUX DISEASE W/OUT ESOPHAGITIS: ICD-10-CM

## 2025-05-21 DIAGNOSIS — J45.20 MILD INTERMITTENT ASTHMA, UNCOMPLICATED: ICD-10-CM

## 2025-05-21 PROCEDURE — G2211 COMPLEX E/M VISIT ADD ON: CPT

## 2025-05-21 PROCEDURE — 93000 ELECTROCARDIOGRAM COMPLETE: CPT

## 2025-05-21 PROCEDURE — 99214 OFFICE O/P EST MOD 30 MIN: CPT

## 2025-08-07 ENCOUNTER — NON-APPOINTMENT (OUTPATIENT)
Age: 89
End: 2025-08-07

## 2025-08-07 ENCOUNTER — APPOINTMENT (OUTPATIENT)
Dept: OPHTHALMOLOGY | Facility: CLINIC | Age: 89
End: 2025-08-07
Payer: MEDICARE

## 2025-08-07 PROCEDURE — 92014 COMPRE OPH EXAM EST PT 1/>: CPT

## 2025-08-07 PROCEDURE — 92134 CPTRZ OPH DX IMG PST SGM RTA: CPT

## 2025-08-07 PROCEDURE — 92083 EXTENDED VISUAL FIELD XM: CPT

## 2025-08-07 PROCEDURE — 92202 OPSCPY EXTND ON/MAC DRAW: CPT

## 2025-09-07 ENCOUNTER — EMERGENCY (EMERGENCY)
Facility: HOSPITAL | Age: 89
LOS: 1 days | End: 2025-09-07
Attending: EMERGENCY MEDICINE | Admitting: EMERGENCY MEDICINE
Payer: MEDICARE

## 2025-09-07 VITALS
HEART RATE: 97 BPM | RESPIRATION RATE: 18 BRPM | TEMPERATURE: 98 F | DIASTOLIC BLOOD PRESSURE: 83 MMHG | SYSTOLIC BLOOD PRESSURE: 132 MMHG | OXYGEN SATURATION: 100 %

## 2025-09-07 VITALS
TEMPERATURE: 98 F | DIASTOLIC BLOOD PRESSURE: 83 MMHG | SYSTOLIC BLOOD PRESSURE: 180 MMHG | WEIGHT: 132.28 LBS | RESPIRATION RATE: 18 BRPM | OXYGEN SATURATION: 97 % | HEART RATE: 95 BPM | HEIGHT: 62 IN

## 2025-09-07 DIAGNOSIS — Z98.890 OTHER SPECIFIED POSTPROCEDURAL STATES: Chronic | ICD-10-CM

## 2025-09-07 LAB
ADD ON TEST-SPECIMEN IN LAB: SIGNIFICANT CHANGE UP
ADD ON TEST-SPECIMEN IN LAB: SIGNIFICANT CHANGE UP
ALBUMIN SERPL ELPH-MCNC: 4.2 G/DL — SIGNIFICANT CHANGE UP (ref 3.3–5)
ALP SERPL-CCNC: 59 U/L — SIGNIFICANT CHANGE UP (ref 40–120)
ALT FLD-CCNC: 13 U/L — SIGNIFICANT CHANGE UP (ref 4–33)
ANION GAP SERPL CALC-SCNC: 13 MMOL/L — SIGNIFICANT CHANGE UP (ref 7–14)
APTT BLD: 30.6 SEC — SIGNIFICANT CHANGE UP (ref 26.1–36.8)
AST SERPL-CCNC: 15 U/L — SIGNIFICANT CHANGE UP (ref 4–32)
BASOPHILS # BLD AUTO: 0.02 K/UL — SIGNIFICANT CHANGE UP (ref 0–0.2)
BASOPHILS NFR BLD AUTO: 0.2 % — SIGNIFICANT CHANGE UP (ref 0–2)
BILIRUB SERPL-MCNC: 1.1 MG/DL — SIGNIFICANT CHANGE UP (ref 0.2–1.2)
BUN SERPL-MCNC: 21 MG/DL — SIGNIFICANT CHANGE UP (ref 7–23)
CALCIUM SERPL-MCNC: 9.4 MG/DL — SIGNIFICANT CHANGE UP (ref 8.4–10.5)
CHLORIDE SERPL-SCNC: 105 MMOL/L — SIGNIFICANT CHANGE UP (ref 98–107)
CO2 SERPL-SCNC: 21 MMOL/L — LOW (ref 22–31)
CREAT SERPL-MCNC: 0.68 MG/DL — SIGNIFICANT CHANGE UP (ref 0.5–1.3)
EGFR: 83 ML/MIN/1.73M2 — SIGNIFICANT CHANGE UP
EGFR: 83 ML/MIN/1.73M2 — SIGNIFICANT CHANGE UP
EOSINOPHIL # BLD AUTO: 0.06 K/UL — SIGNIFICANT CHANGE UP (ref 0–0.5)
EOSINOPHIL NFR BLD AUTO: 0.5 % — SIGNIFICANT CHANGE UP (ref 0–6)
GLUCOSE SERPL-MCNC: 125 MG/DL — HIGH (ref 70–99)
HCT VFR BLD CALC: 36 % — SIGNIFICANT CHANGE UP (ref 34.5–45)
HGB BLD-MCNC: 11.9 G/DL — SIGNIFICANT CHANGE UP (ref 11.5–15.5)
IMM GRANULOCYTES # BLD AUTO: 0.04 K/UL — SIGNIFICANT CHANGE UP (ref 0–0.07)
IMM GRANULOCYTES NFR BLD AUTO: 0.4 % — SIGNIFICANT CHANGE UP (ref 0–0.9)
INR BLD: 0.99 RATIO — SIGNIFICANT CHANGE UP (ref 0.85–1.16)
LYMPHOCYTES # BLD AUTO: 1.53 K/UL — SIGNIFICANT CHANGE UP (ref 1–3.3)
LYMPHOCYTES NFR BLD AUTO: 13.6 % — SIGNIFICANT CHANGE UP (ref 13–44)
MCHC RBC-ENTMCNC: 30.3 PG — SIGNIFICANT CHANGE UP (ref 27–34)
MCHC RBC-ENTMCNC: 33.1 G/DL — SIGNIFICANT CHANGE UP (ref 32–36)
MCV RBC AUTO: 91.6 FL — SIGNIFICANT CHANGE UP (ref 80–100)
MONOCYTES # BLD AUTO: 0.83 K/UL — SIGNIFICANT CHANGE UP (ref 0–0.9)
MONOCYTES NFR BLD AUTO: 7.4 % — SIGNIFICANT CHANGE UP (ref 2–14)
NEUTROPHILS # BLD AUTO: 8.8 K/UL — HIGH (ref 1.8–7.4)
NEUTROPHILS NFR BLD AUTO: 77.9 % — HIGH (ref 43–77)
NRBC # BLD AUTO: 0 K/UL — SIGNIFICANT CHANGE UP (ref 0–0)
NRBC # FLD: 0 K/UL — SIGNIFICANT CHANGE UP (ref 0–0)
NRBC BLD AUTO-RTO: 0 /100 WBCS — SIGNIFICANT CHANGE UP (ref 0–0)
NT-PROBNP SERPL-SCNC: 79 PG/ML — SIGNIFICANT CHANGE UP
PLATELET # BLD AUTO: 205 K/UL — SIGNIFICANT CHANGE UP (ref 150–400)
PMV BLD: 11 FL — SIGNIFICANT CHANGE UP (ref 7–13)
POTASSIUM SERPL-MCNC: 4.1 MMOL/L — SIGNIFICANT CHANGE UP (ref 3.5–5.3)
POTASSIUM SERPL-SCNC: 4.1 MMOL/L — SIGNIFICANT CHANGE UP (ref 3.5–5.3)
PROT SERPL-MCNC: 6.8 G/DL — SIGNIFICANT CHANGE UP (ref 6–8.3)
PROTHROM AB SERPL-ACNC: 11.5 SEC — SIGNIFICANT CHANGE UP (ref 9.9–13.4)
RBC # BLD: 3.93 M/UL — SIGNIFICANT CHANGE UP (ref 3.8–5.2)
RBC # FLD: 14.2 % — SIGNIFICANT CHANGE UP (ref 10.3–14.5)
SODIUM SERPL-SCNC: 139 MMOL/L — SIGNIFICANT CHANGE UP (ref 135–145)
TROPONIN T, HIGH SENSITIVITY RESULT: 11 NG/L — SIGNIFICANT CHANGE UP
WBC # BLD: 11.28 K/UL — HIGH (ref 3.8–10.5)
WBC # FLD AUTO: 11.28 K/UL — HIGH (ref 3.8–10.5)

## 2025-09-07 PROCEDURE — 93010 ELECTROCARDIOGRAM REPORT: CPT

## 2025-09-07 PROCEDURE — 71045 X-RAY EXAM CHEST 1 VIEW: CPT | Mod: 26

## 2025-09-07 PROCEDURE — 99223 1ST HOSP IP/OBS HIGH 75: CPT | Mod: FS

## 2025-09-07 PROCEDURE — 93016 CV STRESS TEST SUPVJ ONLY: CPT | Mod: GC

## 2025-09-07 PROCEDURE — 93018 CV STRESS TEST I&R ONLY: CPT | Mod: GC

## 2025-09-07 PROCEDURE — 78451 HT MUSCLE IMAGE SPECT SING: CPT | Mod: 26

## 2025-09-07 RX ORDER — ASPIRIN 325 MG
162 TABLET ORAL ONCE
Refills: 0 | Status: COMPLETED | OUTPATIENT
Start: 2025-09-07 | End: 2025-09-07

## 2025-09-07 RX ADMIN — Medication 162 MILLIGRAM(S): at 09:01
